# Patient Record
Sex: MALE | Race: WHITE | Employment: OTHER | URBAN - METROPOLITAN AREA
[De-identification: names, ages, dates, MRNs, and addresses within clinical notes are randomized per-mention and may not be internally consistent; named-entity substitution may affect disease eponyms.]

---

## 2017-01-04 PROBLEM — I10 ESSENTIAL HYPERTENSION: Status: ACTIVE | Noted: 2017-01-04

## 2017-06-01 PROBLEM — M54.5 CHRONIC MIDLINE LOW BACK PAIN, WITH SCIATICA PRESENCE UNSPECIFIED: Status: ACTIVE | Noted: 2017-06-01

## 2017-06-01 PROBLEM — G89.29 CHRONIC MIDLINE LOW BACK PAIN, WITH SCIATICA PRESENCE UNSPECIFIED: Status: ACTIVE | Noted: 2017-06-01

## 2017-09-14 PROBLEM — M16.12 ARTHRITIS OF LEFT HIP: Status: ACTIVE | Noted: 2017-09-14

## 2020-07-06 PROBLEM — G56.21 CUBITAL TUNNEL SYNDROME ON RIGHT: Status: ACTIVE | Noted: 2020-07-06

## 2020-07-06 PROBLEM — M65.341 TRIGGER RING FINGER OF RIGHT HAND: Status: ACTIVE | Noted: 2020-07-06

## 2020-07-06 PROBLEM — M65.342 TRIGGER RING FINGER OF LEFT HAND: Status: ACTIVE | Noted: 2020-07-06

## 2020-08-04 PROBLEM — G56.01 RIGHT CARPAL TUNNEL SYNDROME: Status: ACTIVE | Noted: 2020-08-04

## 2020-09-03 PROBLEM — M65.342 TRIGGER RING FINGER OF LEFT HAND: Status: RESOLVED | Noted: 2020-07-06 | Resolved: 2020-09-03

## 2020-09-03 PROBLEM — M65.341 TRIGGER RING FINGER OF RIGHT HAND: Status: RESOLVED | Noted: 2020-07-06 | Resolved: 2020-09-03

## 2020-09-03 PROBLEM — M54.5 CHRONIC MIDLINE LOW BACK PAIN, WITH SCIATICA PRESENCE UNSPECIFIED: Status: RESOLVED | Noted: 2017-06-01 | Resolved: 2020-09-03

## 2020-09-03 PROBLEM — G89.29 CHRONIC MIDLINE LOW BACK PAIN, WITH SCIATICA PRESENCE UNSPECIFIED: Status: RESOLVED | Noted: 2017-06-01 | Resolved: 2020-09-03

## 2021-02-16 PROBLEM — I10 ESSENTIAL HYPERTENSION: Status: RESOLVED | Noted: 2017-01-04 | Resolved: 2021-02-16

## 2021-03-24 RX ORDER — IBUPROFEN 400 MG/1
400 TABLET ORAL EVERY 6 HOURS PRN
Status: ON HOLD | COMMUNITY
End: 2021-05-04

## 2021-04-28 ENCOUNTER — ANESTHESIA EVENT (OUTPATIENT)
Dept: SURGERY | Facility: HOSPITAL | Age: 81
End: 2021-04-28
Payer: MEDICARE

## 2021-04-28 NOTE — H&P (VIEW-ONLY)
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     Physical Exam  Vital Signs:  Ht 5' 6\" (1.676 m)   Wt 187 lb (84.8 kg)   BMI 30.18 kg/m²       antalgic gait  without assi consistent with previous perthes disease. There is no fracture or dislocation. No periosteal reactions or medullary lesions are seen.       MRI hip reviewed   IMPRESSION:   1.  Age-indeterminate shallow segmental insufficiency fracture of the left femoral thorough discussion, the patient has elected to undergo surgical treatment. The details of the surgical procedure were explained including the location of probable incisions and a description of the likely implants to be used.   Models and diagrams were us during surgery, or within several weeks after surgery. Patients with osteopenia or osteoporosis are at increased risk for fracture during and after surgery. You may have problems with your heart and/or kidneys.  Patients with history of heart disease are a including morning of.

## 2021-04-30 ENCOUNTER — LAB ENCOUNTER (OUTPATIENT)
Dept: LAB | Age: 81
End: 2021-04-30
Attending: ORTHOPAEDIC SURGERY
Payer: MEDICARE

## 2021-04-30 DIAGNOSIS — M16.12 ARTHRITIS OF LEFT HIP: ICD-10-CM

## 2021-04-30 PROCEDURE — 86900 BLOOD TYPING SEROLOGIC ABO: CPT

## 2021-04-30 PROCEDURE — 86850 RBC ANTIBODY SCREEN: CPT

## 2021-04-30 PROCEDURE — 86901 BLOOD TYPING SEROLOGIC RH(D): CPT

## 2021-05-03 ENCOUNTER — APPOINTMENT (OUTPATIENT)
Dept: GENERAL RADIOLOGY | Facility: HOSPITAL | Age: 81
End: 2021-05-03
Attending: ORTHOPAEDIC SURGERY
Payer: MEDICARE

## 2021-05-03 ENCOUNTER — ANESTHESIA (OUTPATIENT)
Dept: SURGERY | Facility: HOSPITAL | Age: 81
End: 2021-05-03
Payer: MEDICARE

## 2021-05-03 ENCOUNTER — HOSPITAL ENCOUNTER (OUTPATIENT)
Facility: HOSPITAL | Age: 81
Discharge: HOME HEALTH | End: 2021-05-04
Attending: ORTHOPAEDIC SURGERY | Admitting: ORTHOPAEDIC SURGERY
Payer: MEDICARE

## 2021-05-03 DIAGNOSIS — M16.12 ARTHRITIS OF LEFT HIP: Primary | ICD-10-CM

## 2021-05-03 DIAGNOSIS — Z87.39 HISTORY OF LEGG-CALVE-PERTHES DISEASE: ICD-10-CM

## 2021-05-03 DIAGNOSIS — M16.12 PRIMARY OSTEOARTHRITIS OF LEFT HIP: ICD-10-CM

## 2021-05-03 PROCEDURE — 88304 TISSUE EXAM BY PATHOLOGIST: CPT | Performed by: ORTHOPAEDIC SURGERY

## 2021-05-03 PROCEDURE — 76000 FLUOROSCOPY <1 HR PHYS/QHP: CPT | Performed by: ORTHOPAEDIC SURGERY

## 2021-05-03 PROCEDURE — 0SRB049 REPLACEMENT OF LEFT HIP JOINT WITH CERAMIC ON POLYETHYLENE SYNTHETIC SUBSTITUTE, CEMENTED, OPEN APPROACH: ICD-10-PCS | Performed by: ORTHOPAEDIC SURGERY

## 2021-05-03 PROCEDURE — 88311 DECALCIFY TISSUE: CPT | Performed by: ORTHOPAEDIC SURGERY

## 2021-05-03 DEVICE — REFOBACIN BC R 1X40 US: Type: IMPLANTABLE DEVICE | Site: HIP | Status: FUNCTIONAL

## 2021-05-03 DEVICE — BIOLOX® DELTA, CERAMIC FEMORAL HEAD, S, Ø 36/-3.5, TAPER 12/14
Type: IMPLANTABLE DEVICE | Site: HIP | Status: FUNCTIONAL
Brand: BIOLOX® DELTA

## 2021-05-03 DEVICE — G7 PPS LTD ACET SHL 54F: Type: IMPLANTABLE DEVICE | Site: HIP | Status: FUNCTIONAL

## 2021-05-03 RX ORDER — ACETAMINOPHEN 325 MG/1
650 TABLET ORAL ONCE
Status: COMPLETED | OUTPATIENT
Start: 2021-05-03 | End: 2021-05-03

## 2021-05-03 RX ORDER — METOPROLOL SUCCINATE 100 MG/1
100 TABLET, EXTENDED RELEASE ORAL DAILY
Status: DISCONTINUED | OUTPATIENT
Start: 2021-05-04 | End: 2021-05-04

## 2021-05-03 RX ORDER — ONDANSETRON 2 MG/ML
4 INJECTION INTRAMUSCULAR; INTRAVENOUS EVERY 4 HOURS PRN
Status: DISCONTINUED | OUTPATIENT
Start: 2021-05-03 | End: 2021-05-04

## 2021-05-03 RX ORDER — ACETAMINOPHEN 500 MG
1000 TABLET ORAL ONCE
Status: DISCONTINUED | OUTPATIENT
Start: 2021-05-03 | End: 2021-05-03

## 2021-05-03 RX ORDER — DEXAMETHASONE SODIUM PHOSPHATE 10 MG/ML
8 INJECTION, SOLUTION INTRAMUSCULAR; INTRAVENOUS ONCE
Status: COMPLETED | OUTPATIENT
Start: 2021-05-04 | End: 2021-05-04

## 2021-05-03 RX ORDER — CEFAZOLIN SODIUM/WATER 2 G/20 ML
2 SYRINGE (ML) INTRAVENOUS EVERY 8 HOURS
Status: COMPLETED | OUTPATIENT
Start: 2021-05-03 | End: 2021-05-04

## 2021-05-03 RX ORDER — LISINOPRIL 20 MG/1
20 TABLET ORAL DAILY
COMMUNITY
End: 2021-07-16

## 2021-05-03 RX ORDER — MIDAZOLAM HYDROCHLORIDE 1 MG/ML
1 INJECTION INTRAMUSCULAR; INTRAVENOUS EVERY 5 MIN PRN
Status: DISCONTINUED | OUTPATIENT
Start: 2021-05-03 | End: 2021-05-03 | Stop reason: HOSPADM

## 2021-05-03 RX ORDER — SODIUM PHOSPHATE, DIBASIC AND SODIUM PHOSPHATE, MONOBASIC 7; 19 G/133ML; G/133ML
1 ENEMA RECTAL ONCE AS NEEDED
Status: DISCONTINUED | OUTPATIENT
Start: 2021-05-03 | End: 2021-05-04

## 2021-05-03 RX ORDER — MEPERIDINE HYDROCHLORIDE 25 MG/ML
12.5 INJECTION INTRAMUSCULAR; INTRAVENOUS; SUBCUTANEOUS AS NEEDED
Status: DISCONTINUED | OUTPATIENT
Start: 2021-05-03 | End: 2021-05-03 | Stop reason: HOSPADM

## 2021-05-03 RX ORDER — SENNOSIDES 8.6 MG
17.2 TABLET ORAL NIGHTLY
Status: DISCONTINUED | OUTPATIENT
Start: 2021-05-03 | End: 2021-05-04

## 2021-05-03 RX ORDER — HYDROMORPHONE HYDROCHLORIDE 1 MG/ML
0.4 INJECTION, SOLUTION INTRAMUSCULAR; INTRAVENOUS; SUBCUTANEOUS EVERY 2 HOUR PRN
Status: DISCONTINUED | OUTPATIENT
Start: 2021-05-03 | End: 2021-05-04

## 2021-05-03 RX ORDER — ONDANSETRON 2 MG/ML
4 INJECTION INTRAMUSCULAR; INTRAVENOUS AS NEEDED
Status: DISCONTINUED | OUTPATIENT
Start: 2021-05-03 | End: 2021-05-03 | Stop reason: HOSPADM

## 2021-05-03 RX ORDER — TRANEXAMIC ACID 10 MG/ML
1000 INJECTION, SOLUTION INTRAVENOUS ONCE
Status: COMPLETED | OUTPATIENT
Start: 2021-05-03 | End: 2021-05-03

## 2021-05-03 RX ORDER — DEXAMETHASONE SODIUM PHOSPHATE 4 MG/ML
VIAL (ML) INJECTION AS NEEDED
Status: DISCONTINUED | OUTPATIENT
Start: 2021-05-03 | End: 2021-05-03 | Stop reason: SURG

## 2021-05-03 RX ORDER — HYDROMORPHONE HYDROCHLORIDE 1 MG/ML
0.2 INJECTION, SOLUTION INTRAMUSCULAR; INTRAVENOUS; SUBCUTANEOUS EVERY 2 HOUR PRN
Status: DISCONTINUED | OUTPATIENT
Start: 2021-05-03 | End: 2021-05-04

## 2021-05-03 RX ORDER — DIPHENHYDRAMINE HYDROCHLORIDE 50 MG/ML
25 INJECTION INTRAMUSCULAR; INTRAVENOUS ONCE AS NEEDED
Status: ACTIVE | OUTPATIENT
Start: 2021-05-03 | End: 2021-05-03

## 2021-05-03 RX ORDER — SODIUM CHLORIDE 9 MG/ML
INJECTION, SOLUTION INTRAVENOUS CONTINUOUS
Status: DISCONTINUED | OUTPATIENT
Start: 2021-05-03 | End: 2021-05-04

## 2021-05-03 RX ORDER — OXYCODONE HYDROCHLORIDE 5 MG/1
2.5 TABLET ORAL EVERY 4 HOURS PRN
Status: DISCONTINUED | OUTPATIENT
Start: 2021-05-03 | End: 2021-05-04

## 2021-05-03 RX ORDER — LISINOPRIL 20 MG/1
20 TABLET ORAL NIGHTLY
Status: DISCONTINUED | OUTPATIENT
Start: 2021-05-03 | End: 2021-05-04

## 2021-05-03 RX ORDER — ASPIRIN 81 MG/1
81 TABLET ORAL 2 TIMES DAILY
Status: DISCONTINUED | OUTPATIENT
Start: 2021-05-03 | End: 2021-05-04

## 2021-05-03 RX ORDER — TEMAZEPAM 15 MG/1
15 CAPSULE ORAL NIGHTLY PRN
Status: DISCONTINUED | OUTPATIENT
Start: 2021-05-03 | End: 2021-05-04

## 2021-05-03 RX ORDER — CEFAZOLIN SODIUM/WATER 2 G/20 ML
SYRINGE (ML) INTRAVENOUS
Status: DISPENSED
Start: 2021-05-03 | End: 2021-05-04

## 2021-05-03 RX ORDER — BUPIVACAINE HYDROCHLORIDE 7.5 MG/ML
INJECTION, SOLUTION INTRASPINAL AS NEEDED
Status: DISCONTINUED | OUTPATIENT
Start: 2021-05-03 | End: 2021-05-03 | Stop reason: SURG

## 2021-05-03 RX ORDER — DIPHENHYDRAMINE HYDROCHLORIDE 50 MG/ML
12.5 INJECTION INTRAMUSCULAR; INTRAVENOUS EVERY 4 HOURS PRN
Status: DISCONTINUED | OUTPATIENT
Start: 2021-05-03 | End: 2021-05-04

## 2021-05-03 RX ORDER — NALOXONE HYDROCHLORIDE 0.4 MG/ML
80 INJECTION, SOLUTION INTRAMUSCULAR; INTRAVENOUS; SUBCUTANEOUS AS NEEDED
Status: DISCONTINUED | OUTPATIENT
Start: 2021-05-03 | End: 2021-05-03 | Stop reason: HOSPADM

## 2021-05-03 RX ORDER — KETOROLAC TROMETHAMINE 15 MG/ML
15 INJECTION, SOLUTION INTRAMUSCULAR; INTRAVENOUS EVERY 6 HOURS
Status: DISCONTINUED | OUTPATIENT
Start: 2021-05-03 | End: 2021-05-04

## 2021-05-03 RX ORDER — ACETAMINOPHEN 325 MG/1
TABLET ORAL
Status: COMPLETED
Start: 2021-05-03 | End: 2021-05-03

## 2021-05-03 RX ORDER — SODIUM CHLORIDE, SODIUM LACTATE, POTASSIUM CHLORIDE, CALCIUM CHLORIDE 600; 310; 30; 20 MG/100ML; MG/100ML; MG/100ML; MG/100ML
INJECTION, SOLUTION INTRAVENOUS CONTINUOUS
Status: DISCONTINUED | OUTPATIENT
Start: 2021-05-03 | End: 2021-05-04

## 2021-05-03 RX ORDER — DOCUSATE SODIUM 100 MG/1
100 CAPSULE, LIQUID FILLED ORAL 2 TIMES DAILY
Status: DISCONTINUED | OUTPATIENT
Start: 2021-05-03 | End: 2021-05-04

## 2021-05-03 RX ORDER — SIMVASTATIN 80 MG
80 TABLET ORAL NIGHTLY
COMMUNITY
End: 2021-07-16

## 2021-05-03 RX ORDER — POLYETHYLENE GLYCOL 3350 17 G/17G
17 POWDER, FOR SOLUTION ORAL DAILY PRN
Status: DISCONTINUED | OUTPATIENT
Start: 2021-05-03 | End: 2021-05-04

## 2021-05-03 RX ORDER — HYDROMORPHONE HYDROCHLORIDE 1 MG/ML
0.4 INJECTION, SOLUTION INTRAMUSCULAR; INTRAVENOUS; SUBCUTANEOUS EVERY 5 MIN PRN
Status: DISCONTINUED | OUTPATIENT
Start: 2021-05-03 | End: 2021-05-03 | Stop reason: HOSPADM

## 2021-05-03 RX ORDER — OXYCODONE HYDROCHLORIDE 5 MG/1
5 TABLET ORAL EVERY 4 HOURS PRN
Status: DISCONTINUED | OUTPATIENT
Start: 2021-05-03 | End: 2021-05-04

## 2021-05-03 RX ORDER — DIPHENHYDRAMINE HCL 25 MG
25 CAPSULE ORAL EVERY 4 HOURS PRN
Status: DISCONTINUED | OUTPATIENT
Start: 2021-05-03 | End: 2021-05-04

## 2021-05-03 RX ORDER — ONDANSETRON 2 MG/ML
INJECTION INTRAMUSCULAR; INTRAVENOUS AS NEEDED
Status: DISCONTINUED | OUTPATIENT
Start: 2021-05-03 | End: 2021-05-03 | Stop reason: SURG

## 2021-05-03 RX ORDER — ATORVASTATIN CALCIUM 40 MG/1
40 TABLET, FILM COATED ORAL NIGHTLY
Status: DISCONTINUED | OUTPATIENT
Start: 2021-05-03 | End: 2021-05-04

## 2021-05-03 RX ORDER — CEFAZOLIN SODIUM/WATER 2 G/20 ML
2 SYRINGE (ML) INTRAVENOUS ONCE
Status: COMPLETED | OUTPATIENT
Start: 2021-05-03 | End: 2021-05-03

## 2021-05-03 RX ORDER — SODIUM CHLORIDE, SODIUM LACTATE, POTASSIUM CHLORIDE, CALCIUM CHLORIDE 600; 310; 30; 20 MG/100ML; MG/100ML; MG/100ML; MG/100ML
INJECTION, SOLUTION INTRAVENOUS CONTINUOUS
Status: DISCONTINUED | OUTPATIENT
Start: 2021-05-03 | End: 2021-05-03 | Stop reason: HOSPADM

## 2021-05-03 RX ORDER — BISACODYL 10 MG
10 SUPPOSITORY, RECTAL RECTAL
Status: DISCONTINUED | OUTPATIENT
Start: 2021-05-03 | End: 2021-05-04

## 2021-05-03 RX ORDER — MIDAZOLAM HYDROCHLORIDE 1 MG/ML
INJECTION INTRAMUSCULAR; INTRAVENOUS AS NEEDED
Status: DISCONTINUED | OUTPATIENT
Start: 2021-05-03 | End: 2021-05-03 | Stop reason: SURG

## 2021-05-03 RX ORDER — KETAMINE HYDROCHLORIDE 50 MG/ML
INJECTION, SOLUTION, CONCENTRATE INTRAMUSCULAR; INTRAVENOUS AS NEEDED
Status: DISCONTINUED | OUTPATIENT
Start: 2021-05-03 | End: 2021-05-03 | Stop reason: SURG

## 2021-05-03 RX ORDER — ACETAMINOPHEN 325 MG/1
650 TABLET ORAL 4 TIMES DAILY
Status: DISCONTINUED | OUTPATIENT
Start: 2021-05-03 | End: 2021-05-04

## 2021-05-03 RX ORDER — PROCHLORPERAZINE EDISYLATE 5 MG/ML
10 INJECTION INTRAMUSCULAR; INTRAVENOUS EVERY 6 HOURS PRN
Status: DISCONTINUED | OUTPATIENT
Start: 2021-05-03 | End: 2021-05-04

## 2021-05-03 RX ORDER — METOPROLOL SUCCINATE 100 MG/1
100 TABLET, EXTENDED RELEASE ORAL DAILY
COMMUNITY
End: 2021-07-15

## 2021-05-03 RX ADMIN — ONDANSETRON 4 MG: 2 INJECTION INTRAMUSCULAR; INTRAVENOUS at 15:34:00

## 2021-05-03 RX ADMIN — KETAMINE HYDROCHLORIDE 25 MG: 50 INJECTION, SOLUTION, CONCENTRATE INTRAMUSCULAR; INTRAVENOUS at 15:10:00

## 2021-05-03 RX ADMIN — CEFAZOLIN SODIUM/WATER 2 G: 2 G/20 ML SYRINGE (ML) INTRAVENOUS at 15:00:00

## 2021-05-03 RX ADMIN — TRANEXAMIC ACID 1000 MG: 10 INJECTION, SOLUTION INTRAVENOUS at 15:01:00

## 2021-05-03 RX ADMIN — DEXAMETHASONE SODIUM PHOSPHATE 4 MG: 4 MG/ML VIAL (ML) INJECTION at 15:34:00

## 2021-05-03 RX ADMIN — SODIUM CHLORIDE, SODIUM LACTATE, POTASSIUM CHLORIDE, CALCIUM CHLORIDE: 600; 310; 30; 20 INJECTION, SOLUTION INTRAVENOUS at 16:31:00

## 2021-05-03 RX ADMIN — MIDAZOLAM HYDROCHLORIDE 2 MG: 1 INJECTION INTRAMUSCULAR; INTRAVENOUS at 14:58:00

## 2021-05-03 RX ADMIN — BUPIVACAINE HYDROCHLORIDE 1.3 ML: 7.5 INJECTION, SOLUTION INTRASPINAL at 14:55:00

## 2021-05-03 NOTE — ANESTHESIA POSTPROCEDURE EVALUATION
[address] Patient Status:  Outpatient in a Bed   Age/Gender [de-identified]year old male MRN [id number]   [facility] SURGERY Attending Dary Leung MD   Hosp Day # 0 PCP Elpidio Meza MD       Anesthesia Post-op Note    LEFT TOTA

## 2021-05-03 NOTE — INTERVAL H&P NOTE
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

## 2021-05-03 NOTE — PROGRESS NOTES
PT NOTES SOME IMPROVEMENT AND CAN FEELS TOUCH SENSATION, NUMBNESS BEHIND THE RIGHT KNEE AND RIGHT LATERAL MID THIGH

## 2021-05-03 NOTE — PROGRESS NOTES
PT EXPRESSES CONCERN THAT HE FEELS ABSENCE OF SENSATION ON RIGHT LEG BELOW THE AREA JUST ABOVE MID RIGHT THIGH.   FEELS NUMBNESS AND TOUCH SENSATION AT APPROXIMATELY L3 LEVEL, MOVE RIGHT LEG, FEELS HAS BETTER CONTROL ON LEG MOVEMENT, FLEXION AND EXTENSION,

## 2021-05-03 NOTE — ANESTHESIA PROCEDURE NOTES
Spinal Block  Performed by: Seth Santiago MD  Authorized by: Seth Santiago MD       General Information and Staff    Start Time:  5/3/2021 2:50 PM  End Time:  5/3/2021 3:00 PM  Anesthesiologist:  Seth Santiago MD  Performed by:   Anesthesiologist  Vargas

## 2021-05-03 NOTE — OPERATIVE REPORT
OPERATIVE REPORT    Facility:  [address] [place] [address]    Patient Name:  Nathaly Morse    Age/Gender:  [de-identified]year old male  :  1940    MRN:  [id number]    Date of Operation:  5/3/2021    Preoperative Diagnosis:   LEFT HIP OSTEOARTHRITIS    Postoperative Osteotomy of the femoral neck was performed using a sagittal saw. A corkscrew was used to remove the femoral head. Retractors were placed anterior and posterior to the acetabulum.   The hip labrum as well as the soft tissue in the cotyloid fossa were rosie was then reduced under direct visualization. Fluoroscopy again verified length, offset, implant position and stem fill of the femoral canal.  Fluoro also verified there were no iatrogenic fractures.   The wound was copiously irrigated and the tag sutures i

## 2021-05-04 ENCOUNTER — APPOINTMENT (OUTPATIENT)
Dept: GENERAL RADIOLOGY | Facility: HOSPITAL | Age: 81
End: 2021-05-04
Attending: ORTHOPAEDIC SURGERY
Payer: MEDICARE

## 2021-05-04 VITALS
OXYGEN SATURATION: 93 % | SYSTOLIC BLOOD PRESSURE: 133 MMHG | TEMPERATURE: 98 F | RESPIRATION RATE: 18 BRPM | HEART RATE: 69 BPM | BODY MASS INDEX: 30.65 KG/M2 | WEIGHT: 195.31 LBS | HEIGHT: 67 IN | DIASTOLIC BLOOD PRESSURE: 58 MMHG

## 2021-05-04 PROCEDURE — 97161 PT EVAL LOW COMPLEX 20 MIN: CPT

## 2021-05-04 PROCEDURE — 97165 OT EVAL LOW COMPLEX 30 MIN: CPT

## 2021-05-04 PROCEDURE — 97535 SELF CARE MNGMENT TRAINING: CPT

## 2021-05-04 PROCEDURE — 97116 GAIT TRAINING THERAPY: CPT

## 2021-05-04 PROCEDURE — 73501 X-RAY EXAM HIP UNI 1 VIEW: CPT | Performed by: ORTHOPAEDIC SURGERY

## 2021-05-04 NOTE — HOME CARE LIAISON
Patient setup with [address] pre-operatively. [facility] to follow upon discharge. Met with patient and spouse Skyler Jara at the bedside to discuss [facility] home health services and confirm choice.  Patient and spouse remain agreeab

## 2021-05-04 NOTE — OCCUPATIONAL THERAPY NOTE
OCCUPATIONAL THERAPY QUICK EVALUATION - INPATIENT    Room Number: 374/374-A  Evaluation Date: 5/4/2021     Type of Evaluation: Quick Eval  Presenting Problem: S/P L DAVID 5/3    Physician Order: IP Consult to Occupational Therapy  Reason for Therapy:  ADL/IA toileting aid at home that he uses. SUBJECTIVE   \"We've had friends recommend the sock aid, so we came prepared and have the hip kit\"    Patient self-stated goal is not identified. OBJECTIVE  Precautions: Other (Comment); Bed/chair alarm (No hip pr underpants and pants above knees, and completed a sit > stand T/F via RW to don pants up to hips with supervision. Pt educated on dressing surgical leg first, and using proper body/hand positioning during functional T/F.  Pt educated on using dressing stick candidate based on 38.32 percentage of impairment. Pt has support at home and has practice using assistive devices at home prior to surgery. Based on OT evaluation, pt is safe to DC home. No further skilled OT services is required at this time.       Pa

## 2021-05-04 NOTE — PROGRESS NOTES
Progress Note    Patient: Kristen Singh  Medical record #: [id number]    Kristen Singh is a [de-identified]year old  male who is POD #1 left DAVID. The patient's main complaint today is surgical pain at the operative site. Denies paresthesias/CP/SOA.  Patient has bee PRN  ondansetron HCl (ZOFRAN) injection 4 mg, 4 mg, Intravenous, Q4H PRN  Prochlorperazine Edisylate (COMPAZINE) injection 10 mg, 10 mg, Intravenous, Q6H PRN  [] diphenhydrAMINE HCl (BENADRYL) IV PUSH injection 25 mg, 25 mg, Intravenous, Once PRN  d to bleeding concerns  Disposition: plan discharge today if doing well with PT, consider home vs rehab if needed    Follow up with Dr. Isabella Vera in 2 weeks    Followed by Physician group for medical conditions to include Active Problems:    * No active hospit

## 2021-05-04 NOTE — PLAN OF CARE
Denies pain. States ready to go home. States has all medication that was prescribed already filled & at home. He and wife viewed discharge instruction video. Verbal and written discharge instructions reviewed. Verbalized understanding.   Awaiting trans

## 2021-05-04 NOTE — CONSULTS
[facility] Hospitalist Initial Consult       Reason for consult: Medical Management sp DAVID      History of Present Illness: Patient is a [de-identified]year old male with PMH sig for OA, cad sp cardiomyopathy  who presents sp DAVID.    They tolerated the procedure well without (88.6 kg)   SpO2 96%   BMI 31.53 kg/m²   General:  Alert, no distress, appears stated age. Head:  Normocephalic, without obvious abnormality, atraumatic. Eyes:  Sclera anicteric, No conjunctival pallor, EOMs intact.     Nose: Nares normal. [facility] Hospitalist  Pager [phone]

## 2021-05-04 NOTE — PROGRESS NOTES
[address] Note                                                                   [address]  11/16/1940    SUBJECTIVE:  No acute events. Pain controlled.       OBJECTIVE:

## 2021-05-04 NOTE — CM/SW NOTE
[de-identified] yo sp total hip arthroplasty. Post op protocol orders for discharge planning. Pt was referred to [facility] preoperatively from surgeon's office. PT is recommending home health.      HOME SITUATION  Type of Home: [address]

## 2021-05-04 NOTE — PHYSICAL THERAPY NOTE
PHYSICAL THERAPY QUICK EVALUATION - INPATIENT    Room Number: 374/374-A  Evaluation Date: 5/4/2021  Presenting Problem: s/p left DAVID 5/3/21  Physician Order: PT Eval and Treat    Problem List  Active Problems:    * No active hospital problems.  *      Pas None                   ACTIVITY TOLERANCE                         O2 WALK       AM-PAC '6-Clicks' INPATIENT SHORT FORM - BASIC MOBILITY  How much difficulty does the patient currently have. ..  -   Turning over in bed (including adjusting bedclothes, sheets chair; With [address] [facility] staff;Needs met;Call light within reach;RN aware of session/findings; All patient questions and concerns addressed (working with OT in pt room)    ASSESSMENT   Patient is a [de-identified]year old male admitted on 5/3/2021 for elective left DAVID.   Taty Parker

## 2021-05-04 NOTE — CM/SW NOTE
05/04/21 1000   Discharge disposition   Expected discharge disposition Home-Health   Name of Facillity/Home Care/Hospice [facility]   Discharge transportation Private car

## 2021-05-04 NOTE — PLAN OF CARE
PT AOX4 this PM. VSS on RA. Decreased sensation to LLE. Sensation to RLE improving, pt explains he can feel lighter touch to R thigh and R calf. , IS. SCDS bilaterally. WBAT. Pt resting in bed tonight. Denies pain, pt is comfortable for now.  IVFS infusi

## 2021-05-19 PROBLEM — Z96.642 STATUS POST TOTAL HIP REPLACEMENT, LEFT: Status: ACTIVE | Noted: 2021-05-19

## 2021-11-29 NOTE — ANESTHESIA PREPROCEDURE EVALUATION
PRE-OP EVALUATION    Patient Name: Julio Rivera    Admit Diagnosis: Primary osteoarthritis of left hip [M16.12]  History of Ujog-Tsqlb-Ydvlvqy disease [Z87.39]    Pre-op Diagnosis: Primary osteoarthritis of left hip [M16.12]  History of Mcen-Jsfnv-Tmhuyp Ok to take over the counter and ok to continue past 2 weeks.  Do not exceed 4,000 mg per day., Disp: 168 tablet, Rfl: 0, 5/3/2021 at 0928  ibuprofen 400 MG Oral Tab, Take 400 mg by mouth every 6 (six) hours as needed for Pain., Disp: , Rfl: , 4/12/2021  asp larger, the ejection fraction is lower,    and there is a small component of ischemia.     Prepared and signed by  Solange Cortés MD, [facility]  02/12/2020 15:49      Cleared by cardiology   Negative cardiovascular ROS. ECG reviewed.   Exercise tolerance: good meets guidelines. Patient has taken beta blockers in last 24 hours. Comment: Discussed Spinal and GA as a backup. All questions answered.   Plan/risks discussed with: patient                Present on Admission:  **None** 149.9

## 2022-01-31 PROBLEM — M65.321 TRIGGER INDEX FINGER OF RIGHT HAND: Status: ACTIVE | Noted: 2020-07-06

## 2022-01-31 PROBLEM — M65.342 TRIGGER RING FINGER OF LEFT HAND: Status: ACTIVE | Noted: 2022-01-31

## 2022-03-30 DIAGNOSIS — Z01.812 PRE-PROCEDURE LAB EXAM: Primary | ICD-10-CM

## 2022-03-31 RX ORDER — SIMVASTATIN 80 MG
80 TABLET ORAL NIGHTLY
COMMUNITY
End: 2022-05-11 | Stop reason: ALTCHOICE

## 2022-03-31 RX ORDER — LANOLIN ALCOHOL/MO/W.PET/CERES
1000 CREAM (GRAM) TOPICAL DAILY
COMMUNITY

## 2022-03-31 RX ORDER — FLUOROURACIL 50 MG/G
CREAM TOPICAL
COMMUNITY

## 2022-03-31 RX ORDER — LISINOPRIL 20 MG/1
20 TABLET ORAL AT BEDTIME
Status: ON HOLD | COMMUNITY
End: 2022-05-18 | Stop reason: HOSPADM

## 2022-03-31 RX ORDER — METOPROLOL SUCCINATE 100 MG/1
100 TABLET, EXTENDED RELEASE ORAL DAILY
Status: ON HOLD | COMMUNITY
End: 2022-05-18 | Stop reason: HOSPADM

## 2022-04-01 ENCOUNTER — HOSPITAL ENCOUNTER (OUTPATIENT)
Dept: LAB | Age: 82
Discharge: HOME | End: 2022-04-01
Attending: INTERNAL MEDICINE

## 2022-04-01 DIAGNOSIS — Z01.812 PRE-PROCEDURE LAB EXAM: ICD-10-CM

## 2022-04-01 LAB
FLUAV RNA RESP QL NAA+PROBE: NOT DETECTED
FLUBV RNA RESP QL NAA+PROBE: NOT DETECTED
SARS-COV-2 RNA RESP QL NAA+PROBE: NOT DETECTED
SERVICE CMNT-IMP: NORMAL
SERVICE CMNT-IMP: NORMAL

## 2022-04-01 PROCEDURE — U0003 INFECTIOUS AGENT DETECTION BY NUCLEIC ACID (DNA OR RNA); SEVERE ACUTE RESPIRATORY SYNDROME CORONAVIRUS 2 (SARS-COV-2) (CORONAVIRUS DISEASE [COVID-19]), AMPLIFIED PROBE TECHNIQUE, MAKING USE OF HIGH THROUGHPUT TECHNOLOGIES AS DESCRIBED BY CMS-2020-01-R: HCPCS | Performed by: INTERNAL MEDICINE

## 2022-04-04 ENCOUNTER — HOSPITAL ENCOUNTER (OUTPATIENT)
Age: 82
Discharge: HOME | End: 2022-04-04
Attending: INTERNAL MEDICINE | Admitting: INTERNAL MEDICINE

## 2022-04-04 ENCOUNTER — APPOINTMENT (OUTPATIENT)
Dept: ULTRASOUND IMAGING | Age: 82
End: 2022-04-04
Attending: NURSE PRACTITIONER

## 2022-04-04 ENCOUNTER — PREP FOR CASE (OUTPATIENT)
Dept: SURGERY | Age: 82
End: 2022-04-04

## 2022-04-04 ENCOUNTER — APPOINTMENT (OUTPATIENT)
Dept: CARDIOLOGY | Age: 82
End: 2022-04-04
Attending: NURSE PRACTITIONER

## 2022-04-04 VITALS
RESPIRATION RATE: 15 BRPM | SYSTOLIC BLOOD PRESSURE: 128 MMHG | HEIGHT: 67 IN | WEIGHT: 193.34 LBS | DIASTOLIC BLOOD PRESSURE: 63 MMHG | OXYGEN SATURATION: 98 % | TEMPERATURE: 98.2 F | BODY MASS INDEX: 30.35 KG/M2 | HEART RATE: 78 BPM

## 2022-04-04 DIAGNOSIS — I25.10 CORONARY ARTERY DISEASE INVOLVING NATIVE CORONARY ARTERY OF NATIVE HEART WITHOUT ANGINA PECTORIS: Primary | ICD-10-CM

## 2022-04-04 DIAGNOSIS — R93.1 ABNORMAL NUCLEAR CARDIAC IMAGING TEST: ICD-10-CM

## 2022-04-04 LAB
AORTIC VALVE AREA: NORMAL
AV MEAN GRADIENT (AVMG): NORMAL
AV MEAN VELOCITY (AVMV): NORMAL
AV PEAK GRADIENT (AVPG): NORMAL
AV PEAK VELOCITY (AVPV): NORMAL
AV STENOSIS SEVERITY TEXT: NORMAL
DEPRECATED RDW RBC: 47.9 FL (ref 39–50)
ERYTHROCYTE [DISTWIDTH] IN BLOOD: 13.2 % (ref 11–15)
HCT VFR BLD CALC: 41.8 % (ref 39–51)
HGB BLD-MCNC: 13.8 G/DL (ref 13–17)
LV EF: NORMAL %
MCH RBC QN AUTO: 32.8 PG (ref 26–34)
MCHC RBC AUTO-ENTMCNC: 33 G/DL (ref 32–36.5)
MCV RBC AUTO: 99.3 FL (ref 78–100)
NRBC BLD MANUAL-RTO: 0 /100 WBC
PLATELET # BLD AUTO: 187 K/MCL (ref 140–450)
RBC # BLD: 4.21 MIL/MCL (ref 4.5–5.9)
WBC # BLD: 6.2 K/MCL (ref 4.2–11)

## 2022-04-04 PROCEDURE — C1769 GUIDE WIRE: HCPCS | Performed by: INTERNAL MEDICINE

## 2022-04-04 PROCEDURE — 93880 EXTRACRANIAL BILAT STUDY: CPT

## 2022-04-04 PROCEDURE — 99153 MOD SED SAME PHYS/QHP EA: CPT | Performed by: INTERNAL MEDICINE

## 2022-04-04 PROCEDURE — 13000001 HB PHASE II RECOVERY EA 30 MINUTES: Performed by: INTERNAL MEDICINE

## 2022-04-04 PROCEDURE — C1894 INTRO/SHEATH, NON-LASER: HCPCS | Performed by: INTERNAL MEDICINE

## 2022-04-04 PROCEDURE — 93458 L HRT ARTERY/VENTRICLE ANGIO: CPT | Performed by: INTERNAL MEDICINE

## 2022-04-04 PROCEDURE — C1887 CATHETER, GUIDING: HCPCS | Performed by: INTERNAL MEDICINE

## 2022-04-04 PROCEDURE — 10002800 HB RX 250 W HCPCS: Performed by: INTERNAL MEDICINE

## 2022-04-04 PROCEDURE — 99152 MOD SED SAME PHYS/QHP 5/>YRS: CPT | Performed by: INTERNAL MEDICINE

## 2022-04-04 PROCEDURE — 10006023 HB SUPPLY 272: Performed by: INTERNAL MEDICINE

## 2022-04-04 PROCEDURE — 85027 COMPLETE CBC AUTOMATED: CPT | Performed by: INTERNAL MEDICINE

## 2022-04-04 PROCEDURE — 10002807 HB RX 258: Performed by: INTERNAL MEDICINE

## 2022-04-04 PROCEDURE — 10002805 HB CONTRAST AGENT: Performed by: INTERNAL MEDICINE

## 2022-04-04 PROCEDURE — 93306 TTE W/DOPPLER COMPLETE: CPT

## 2022-04-04 PROCEDURE — 10002801 HB RX 250 W/O HCPCS: Performed by: INTERNAL MEDICINE

## 2022-04-04 RX ORDER — DEXTROSE AND SODIUM CHLORIDE 5; .45 G/100ML; G/100ML
INJECTION, SOLUTION INTRAVENOUS CONTINUOUS
Status: CANCELLED | OUTPATIENT
Start: 2022-04-04

## 2022-04-04 RX ORDER — 0.9 % SODIUM CHLORIDE 0.9 %
2 VIAL (ML) INJECTION EVERY 12 HOURS SCHEDULED
Status: DISCONTINUED | OUTPATIENT
Start: 2022-04-04 | End: 2022-04-04 | Stop reason: HOSPADM

## 2022-04-04 RX ORDER — SODIUM CHLORIDE 9 MG/ML
INJECTION, SOLUTION INTRAVENOUS CONTINUOUS
Status: DISCONTINUED | OUTPATIENT
Start: 2022-04-04 | End: 2022-04-04 | Stop reason: HOSPADM

## 2022-04-04 RX ORDER — LIDOCAINE HYDROCHLORIDE 20 MG/ML
INJECTION, SOLUTION EPIDURAL; INFILTRATION; INTRACAUDAL; PERINEURAL PRN
Status: DISCONTINUED | OUTPATIENT
Start: 2022-04-04 | End: 2022-04-04 | Stop reason: HOSPADM

## 2022-04-04 RX ORDER — SODIUM CHLORIDE 9 MG/ML
INJECTION, SOLUTION INTRAVENOUS CONTINUOUS PRN
Status: CANCELLED | OUTPATIENT
Start: 2022-04-04

## 2022-04-04 RX ORDER — MIDAZOLAM HYDROCHLORIDE 1 MG/ML
INJECTION, SOLUTION INTRAMUSCULAR; INTRAVENOUS PRN
Status: DISCONTINUED | OUTPATIENT
Start: 2022-04-04 | End: 2022-04-04 | Stop reason: HOSPADM

## 2022-04-04 RX ORDER — NICOTINE POLACRILEX 4 MG
30 LOZENGE BUCCAL PRN
Status: CANCELLED | OUTPATIENT
Start: 2022-04-04

## 2022-04-04 RX ORDER — VERAPAMIL HYDROCHLORIDE 2.5 MG/ML
INJECTION, SOLUTION INTRAVENOUS PRN
Status: DISCONTINUED | OUTPATIENT
Start: 2022-04-04 | End: 2022-04-04 | Stop reason: HOSPADM

## 2022-04-04 RX ORDER — DEXTROSE MONOHYDRATE 25 G/50ML
12.5 INJECTION, SOLUTION INTRAVENOUS PRN
Status: CANCELLED | OUTPATIENT
Start: 2022-04-04

## 2022-04-04 RX ORDER — SODIUM CHLORIDE 9 MG/ML
INJECTION, SOLUTION INTRAVENOUS CONTINUOUS
Status: ACTIVE | OUTPATIENT
Start: 2022-04-04 | End: 2022-04-04

## 2022-04-04 RX ORDER — NICOTINE POLACRILEX 4 MG
15 LOZENGE BUCCAL PRN
Status: CANCELLED | OUTPATIENT
Start: 2022-04-04

## 2022-04-04 RX ORDER — HYDRALAZINE HYDROCHLORIDE 20 MG/ML
10 INJECTION INTRAMUSCULAR; INTRAVENOUS EVERY 4 HOURS PRN
Status: DISCONTINUED | OUTPATIENT
Start: 2022-04-04 | End: 2022-04-04 | Stop reason: HOSPADM

## 2022-04-04 RX ORDER — HEPARIN SODIUM 1000 [USP'U]/ML
INJECTION, SOLUTION INTRAVENOUS; SUBCUTANEOUS PRN
Status: DISCONTINUED | OUTPATIENT
Start: 2022-04-04 | End: 2022-04-04 | Stop reason: HOSPADM

## 2022-04-04 RX ORDER — DEXTROSE MONOHYDRATE 25 G/50ML
25 INJECTION, SOLUTION INTRAVENOUS PRN
Status: CANCELLED | OUTPATIENT
Start: 2022-04-04

## 2022-04-04 RX ORDER — CLONIDINE HYDROCHLORIDE 0.1 MG/1
0.1 TABLET ORAL EVERY 4 HOURS PRN
Status: DISCONTINUED | OUTPATIENT
Start: 2022-04-04 | End: 2022-04-04 | Stop reason: HOSPADM

## 2022-04-04 RX ORDER — ASPIRIN 325 MG
325 TABLET ORAL ONCE
Status: DISCONTINUED | OUTPATIENT
Start: 2022-04-04 | End: 2022-04-04 | Stop reason: HOSPADM

## 2022-04-04 RX ORDER — GABAPENTIN 100 MG/1
300 CAPSULE ORAL ONCE
Status: CANCELLED | OUTPATIENT
Start: 2022-05-13

## 2022-04-04 RX ADMIN — SODIUM CHLORIDE: 9 INJECTION, SOLUTION INTRAVENOUS at 06:30

## 2022-04-04 ASSESSMENT — PAIN SCALES - GENERAL
PAINLEVEL_OUTOF10: 0

## 2022-04-19 NOTE — PLAN OF CARE
A user error has taken place: .   Denies pain. Left hip dressing clean, dry, intact. Hip precautions maintained. Has ambulated in hallway. Gait steady. States ready to go home later today.    Instructed on plan of care, pain management, call don't fall protocol, verbalized understandin

## 2022-05-11 ENCOUNTER — LAB SERVICES (OUTPATIENT)
Dept: LAB | Age: 82
End: 2022-05-11

## 2022-05-11 ENCOUNTER — HOSPITAL ENCOUNTER (OUTPATIENT)
Dept: GENERAL RADIOLOGY | Age: 82
Discharge: HOME | DRG: 235 | End: 2022-05-11
Attending: THORACIC SURGERY (CARDIOTHORACIC VASCULAR SURGERY)

## 2022-05-11 ENCOUNTER — HOSPITAL ENCOUNTER (OUTPATIENT)
Dept: PREADMISSION TESTING | Age: 82
Discharge: HOME | DRG: 235 | End: 2022-05-11
Attending: THORACIC SURGERY (CARDIOTHORACIC VASCULAR SURGERY)

## 2022-05-11 ENCOUNTER — HOSPITAL ENCOUNTER (OUTPATIENT)
Dept: LAB | Age: 82
Discharge: HOME | DRG: 235 | End: 2022-05-11
Attending: THORACIC SURGERY (CARDIOTHORACIC VASCULAR SURGERY)

## 2022-05-11 DIAGNOSIS — I25.10 CORONARY ARTERY DISEASE INVOLVING NATIVE HEART, UNSPECIFIED VESSEL OR LESION TYPE, UNSPECIFIED WHETHER ANGINA PRESENT: ICD-10-CM

## 2022-05-11 DIAGNOSIS — I25.10 CORONARY ARTERY DISEASE INVOLVING NATIVE CORONARY ARTERY OF NATIVE HEART WITHOUT ANGINA PECTORIS: ICD-10-CM

## 2022-05-11 DIAGNOSIS — Z01.810 PRE-OPERATIVE CARDIOVASCULAR EXAMINATION: Primary | ICD-10-CM

## 2022-05-11 LAB
ABO + RH BLD: NORMAL
ALBUMIN SERPL-MCNC: 3.6 G/DL (ref 3.6–5.1)
ALBUMIN/GLOB SERPL: 1 {RATIO} (ref 1–2.4)
ALP SERPL-CCNC: 48 UNITS/L (ref 45–117)
ALT SERPL-CCNC: 25 UNITS/L
ANION GAP SERPL CALC-SCNC: 9 MMOL/L (ref 10–20)
APPEARANCE UR: CLEAR
APTT PPP: 26 SEC (ref 22–30)
AST SERPL-CCNC: 22 UNITS/L
BILIRUB SERPL-MCNC: 0.9 MG/DL (ref 0.2–1)
BILIRUB UR QL STRIP: NEGATIVE
BLD GP AB SCN SERPL QL GEL: NEGATIVE
BUN SERPL-MCNC: 24 MG/DL (ref 6–20)
BUN/CREAT SERPL: 21 (ref 7–25)
CALCIUM SERPL-MCNC: 9.2 MG/DL (ref 8.4–10.2)
CHLORIDE SERPL-SCNC: 109 MMOL/L (ref 98–107)
CO2 SERPL-SCNC: 27 MMOL/L (ref 21–32)
COLOR UR: YELLOW
CREAT SERPL-MCNC: 1.14 MG/DL (ref 0.67–1.17)
DEPRECATED RDW RBC: 46.6 FL (ref 39–50)
ERYTHROCYTE [DISTWIDTH] IN BLOOD: 12.8 % (ref 11–15)
FASTING DURATION TIME PATIENT: ABNORMAL H
GFR SERPLBLD BASED ON 1.73 SQ M-ARVRAT: 65 ML/MIN
GLOBULIN SER-MCNC: 3.6 G/DL (ref 2–4)
GLUCOSE SERPL-MCNC: 92 MG/DL (ref 70–99)
GLUCOSE UR STRIP-MCNC: NEGATIVE MG/DL
HBA1C MFR BLD: 5.5 % (ref 4.5–5.6)
HCT VFR BLD CALC: 41.1 % (ref 39–51)
HGB BLD-MCNC: 13.5 G/DL (ref 13–17)
HGB UR QL STRIP: NEGATIVE
INR PPP: 1
KETONES UR STRIP-MCNC: NEGATIVE MG/DL
LEUKOCYTE ESTERASE UR QL STRIP: NEGATIVE
MCH RBC QN AUTO: 32.5 PG (ref 26–34)
MCHC RBC AUTO-ENTMCNC: 32.8 G/DL (ref 32–36.5)
MCV RBC AUTO: 99 FL (ref 78–100)
NITRITE UR QL STRIP: NEGATIVE
NRBC BLD MANUAL-RTO: 0 /100 WBC
PH UR STRIP: 6 [PH] (ref 5–7)
PLATELET # BLD AUTO: 164 K/MCL (ref 140–450)
POTASSIUM SERPL-SCNC: 4.4 MMOL/L (ref 3.4–5.1)
PROT SERPL-MCNC: 7.2 G/DL (ref 6.4–8.2)
PROT UR STRIP-MCNC: NEGATIVE MG/DL
PROTHROMBIN TIME: 11 SEC (ref 9.7–11.8)
RBC # BLD: 4.15 MIL/MCL (ref 4.5–5.9)
SARS-COV-2 RNA RESP QL NAA+PROBE: NOT DETECTED
SERVICE CMNT-IMP: NORMAL
SERVICE CMNT-IMP: NORMAL
SODIUM SERPL-SCNC: 141 MMOL/L (ref 135–145)
SP GR UR STRIP: 1.02 (ref 1–1.03)
TYPE AND SCREEN EXPIRATION DATE: NORMAL
UROBILINOGEN UR STRIP-MCNC: 0.2 MG/DL
WBC # BLD: 6.5 K/MCL (ref 4.2–11)

## 2022-05-11 PROCEDURE — 85730 THROMBOPLASTIN TIME PARTIAL: CPT

## 2022-05-11 PROCEDURE — 83036 HEMOGLOBIN GLYCOSYLATED A1C: CPT

## 2022-05-11 PROCEDURE — U0005 INFEC AGEN DETEC AMPLI PROBE: HCPCS

## 2022-05-11 PROCEDURE — 85027 COMPLETE CBC AUTOMATED: CPT

## 2022-05-11 PROCEDURE — 80053 COMPREHEN METABOLIC PANEL: CPT

## 2022-05-11 PROCEDURE — 85610 PROTHROMBIN TIME: CPT

## 2022-05-11 PROCEDURE — 81003 URINALYSIS AUTO W/O SCOPE: CPT

## 2022-05-11 PROCEDURE — 36415 COLL VENOUS BLD VENIPUNCTURE: CPT

## 2022-05-11 PROCEDURE — 71046 X-RAY EXAM CHEST 2 VIEWS: CPT

## 2022-05-11 PROCEDURE — 86850 RBC ANTIBODY SCREEN: CPT

## 2022-05-11 RX ORDER — ROSUVASTATIN CALCIUM 40 MG/1
40 TABLET, COATED ORAL AT BEDTIME
COMMUNITY

## 2022-05-11 ASSESSMENT — ACTIVITIES OF DAILY LIVING (ADL)
SENSORY_SUPPORT_DEVICES: EYEGLASSES;HEARING AIDS;DENTURES
ADL_SCORE: 12
ADL_BEFORE_ADMISSION: INDEPENDENT

## 2022-05-12 ENCOUNTER — ANESTHESIA EVENT (OUTPATIENT)
Dept: SURGERY | Age: 82
DRG: 235 | End: 2022-05-12

## 2022-05-13 ENCOUNTER — HOSPITAL ENCOUNTER (INPATIENT)
Age: 82
LOS: 5 days | Discharge: HOME HEALTH | DRG: 235 | End: 2022-05-18
Attending: THORACIC SURGERY (CARDIOTHORACIC VASCULAR SURGERY) | Admitting: THORACIC SURGERY (CARDIOTHORACIC VASCULAR SURGERY)

## 2022-05-13 ENCOUNTER — ANESTHESIA (OUTPATIENT)
Dept: SURGERY | Age: 82
DRG: 235 | End: 2022-05-13

## 2022-05-13 DIAGNOSIS — I25.10 CAD, MULTIPLE VESSEL: ICD-10-CM

## 2022-05-13 DIAGNOSIS — G89.18 POST-OP PAIN: ICD-10-CM

## 2022-05-13 DIAGNOSIS — Z95.1 S/P CABG (CORONARY ARTERY BYPASS GRAFT): ICD-10-CM

## 2022-05-13 DIAGNOSIS — I25.10 CORONARY ARTERY DISEASE INVOLVING NATIVE CORONARY ARTERY OF NATIVE HEART WITHOUT ANGINA PECTORIS: ICD-10-CM

## 2022-05-13 DIAGNOSIS — N17.9 AKI (ACUTE KIDNEY INJURY) (CMD): ICD-10-CM

## 2022-05-13 DIAGNOSIS — I25.10 CORONARY ARTERY DISEASE INVOLVING NATIVE HEART, UNSPECIFIED VESSEL OR LESION TYPE, UNSPECIFIED WHETHER ANGINA PRESENT: Primary | ICD-10-CM

## 2022-05-13 LAB
ANION GAP SERPL CALC-SCNC: 9 MMOL/L (ref 10–20)
APTT PPP: 30 SEC (ref 22–30)
APTT PPP: 34 SEC (ref 22–30)
ATRIAL RATE (BPM): 90
BASE EXCESS / DEFICIT, ARTERIAL - RESPIRATORY: -1 MMOL/L (ref -2–3)
BASE EXCESS / DEFICIT, ARTERIAL - RESPIRATORY: -1 MMOL/L (ref -2–3)
BASE EXCESS / DEFICIT, ARTERIAL - RESPIRATORY: -2 MMOL/L (ref -2–3)
BASE EXCESS / DEFICIT, ARTERIAL - RESPIRATORY: -2 MMOL/L (ref -2–3)
BASE EXCESS / DEFICIT, ARTERIAL - RESPIRATORY: -3 MMOL/L (ref -2–3)
BASE EXCESS / DEFICIT, ARTERIAL - RESPIRATORY: 0 MMOL/L (ref -2–3)
BASE EXCESS / DEFICIT, ARTERIAL - RESPIRATORY: 1 MMOL/L (ref -2–3)
BASE EXCESS / DEFICIT, MIXED VENOUS - RESPIRATORY: -3 MMOL/L
BASE EXCESS / DEFICIT, VENOUS - RESPIRATORY: 3 MMOL/L (ref -2–2)
BDY SITE: ABNORMAL
BODY TEMPERATURE: 32 DEGREES
BODY TEMPERATURE: 32 DEGREES
BODY TEMPERATURE: 36.5 DEGREES
BODY TEMPERATURE: 36.7 DEGREES
BODY TEMPERATURE: 36.7 DEGREES
BODY TEMPERATURE: 36.9 DEGREES
BODY TEMPERATURE: 37 DEGREES
BUN SERPL-MCNC: 16 MG/DL (ref 6–20)
BUN/CREAT SERPL: 16 (ref 7–25)
CA-I BLD-SCNC: 1.02 MMOL/L (ref 1.15–1.29)
CA-I BLD-SCNC: 1.03 MMOL/L (ref 1.15–1.29)
CA-I BLD-SCNC: 1.04 MMOL/L (ref 1.15–1.29)
CA-I BLD-SCNC: 1.17 MMOL/L (ref 1.15–1.29)
CA-I BLD-SCNC: 1.19 MMOL/L (ref 1.15–1.29)
CA-I BLD-SCNC: 1.19 MMOL/L (ref 1.15–1.29)
CA-I BLD-SCNC: 1.21 MMOL/L (ref 1.15–1.29)
CA-I BLD-SCNC: 1.23 MMOL/L (ref 1.15–1.29)
CA-I BLD-SCNC: 1.23 MMOL/L (ref 1.15–1.29)
CALCIUM SERPL-MCNC: 8.3 MG/DL (ref 8.4–10.2)
CHLORIDE BLD-SCNC: 109 MMOL/L (ref 98–107)
CHLORIDE BLD-SCNC: 109 MMOL/L (ref 98–107)
CHLORIDE BLD-SCNC: 111 MMOL/L (ref 98–107)
CHLORIDE BLD-SCNC: 112 MMOL/L (ref 98–107)
CHLORIDE BLD-SCNC: 112 MMOL/L (ref 98–107)
CHLORIDE BLD-SCNC: 113 MMOL/L (ref 98–107)
CHLORIDE SERPL-SCNC: 115 MMOL/L (ref 98–107)
CO2 SERPL-SCNC: 24 MMOL/L (ref 21–32)
COHGB MFR BLDMV: 1 %
COHGB MFR BLDV: 0.8 %
COHGB MFR BLDV: 0.8 %
COHGB MFR BLDV: 1 %
COHGB MFR BLDV: 1.1 %
COHGB MFR BLDV: 1.1 %
COHGB MFR BLDV: 1.2 %
COHGB MFR BLDV: 1.4 %
COHGB MFR BLDV: 2.1 %
CONDITION: ABNORMAL
CREAT SERPL-MCNC: 0.97 MG/DL (ref 0.67–1.17)
DEPRECATED RDW RBC: 48 FL (ref 39–50)
ERYTHROCYTE [DISTWIDTH] IN BLOOD: 12.9 % (ref 11–15)
FASTING DURATION TIME PATIENT: ABNORMAL H
FIO2 ON VENT: 36 %
FIO2 ON VENT: 50 %
FIO2 ON VENT: 50 %
GFR SERPLBLD BASED ON 1.73 SQ M-ARVRAT: 78 ML/MIN
GLUCOSE BLD-MCNC: 102 MG/DL (ref 65–99)
GLUCOSE BLD-MCNC: 104 MG/DL (ref 65–99)
GLUCOSE BLD-MCNC: 125 MG/DL (ref 65–99)
GLUCOSE BLD-MCNC: 126 MG/DL (ref 65–99)
GLUCOSE BLD-MCNC: 133 MG/DL (ref 65–99)
GLUCOSE BLD-MCNC: 137 MG/DL (ref 65–99)
GLUCOSE BLD-MCNC: 91 MG/DL (ref 65–99)
GLUCOSE BLD-MCNC: 91 MG/DL (ref 65–99)
GLUCOSE BLD-MCNC: 95 MG/DL (ref 65–99)
GLUCOSE BLDC GLUCOMTR-MCNC: 124 MG/DL (ref 70–99)
GLUCOSE BLDC GLUCOMTR-MCNC: 130 MG/DL (ref 70–99)
GLUCOSE BLDC GLUCOMTR-MCNC: 139 MG/DL (ref 70–99)
GLUCOSE BLDC GLUCOMTR-MCNC: 140 MG/DL (ref 70–99)
GLUCOSE BLDC GLUCOMTR-MCNC: 144 MG/DL (ref 70–99)
GLUCOSE BLDC GLUCOMTR-MCNC: 151 MG/DL (ref 70–99)
GLUCOSE SERPL-MCNC: 103 MG/DL (ref 70–99)
HBA1C MFR BLD: 5.2 % (ref 4.5–5.6)
HCO3 BLDA-SCNC: 23 MMOL/L (ref 22–28)
HCO3 BLDA-SCNC: 23 MMOL/L (ref 22–28)
HCO3 BLDA-SCNC: 24 MMOL/L (ref 22–28)
HCO3 BLDA-SCNC: 24 MMOL/L (ref 22–28)
HCO3 BLDA-SCNC: 25 MMOL/L (ref 22–28)
HCO3 BLDA-SCNC: 25 MMOL/L (ref 22–28)
HCO3 BLDA-SCNC: 26 MMOL/L (ref 22–28)
HCO3 BLDMV-SCNC: 23 MMOL/L
HCO3 BLDV-SCNC: 28.9 MMOL/L (ref 22–28)
HCT VFR BLD CALC: 28 % (ref 39–51)
HCT VFR BLD CALC: 29 % (ref 39–51)
HCT VFR BLD CALC: 32 % (ref 39–51)
HCT VFR BLD CALC: 32 % (ref 39–51)
HCT VFR BLD CALC: 32.5 % (ref 39–51)
HCT VFR BLD CALC: 34 % (ref 39–51)
HCT VFR BLD CALC: 34 % (ref 39–51)
HCT VFR BLD CALC: 38 % (ref 39–51)
HGB BLD-MCNC: 10.5 G/DL (ref 13–17)
HGB BLD-MCNC: 10.6 G/DL (ref 13–17)
HGB BLD-MCNC: 10.7 G/DL (ref 13–17)
HGB BLD-MCNC: 11.2 G/DL (ref 13–17)
HGB BLD-MCNC: 11.4 G/DL (ref 13–17)
HGB BLD-MCNC: 12.7 G/DL (ref 13–17)
HGB BLD-MCNC: 9.2 G/DL (ref 13–17)
HGB BLD-MCNC: 9.4 G/DL (ref 13–17)
HGB BLD-MCNC: 9.4 G/DL (ref 13–17)
HGB BLD-MCNC: 9.7 G/DL (ref 13–17)
HOROWITZ INDEX BLDA+IHG-RTO: 288 MM[HG] (ref 300–500)
HOROWITZ INDEX BLDA+IHG-RTO: 494 MM[HG] (ref 300–500)
INR PPP: 1.2
INR PPP: 1.4
MAGNESIUM SERPL-MCNC: 2.4 MG/DL (ref 1.7–2.4)
MCH RBC QN AUTO: 32.8 PG (ref 26–34)
MCHC RBC AUTO-ENTMCNC: 32.6 G/DL (ref 32–36.5)
MCV RBC AUTO: 100.6 FL (ref 78–100)
METHGB MFR BLDMV: 0.1 %
METHGB MFR BLDMV: 0.4 %
METHGB MFR BLDMV: 0.4 %
METHGB MFR BLDMV: 0.5 %
METHGB MFR BLDMV: 0.6 %
METHGB MFR BLDMV: 0.7 %
METHGB MFR BLDMV: 0.7 %
METHGB MFR BLDMV: 0.8 %
METHGB MFR BLDMV: 0.9 %
NRBC BLD MANUAL-RTO: 0 /100 WBC
OXYHGB MFR BLDA: 96.9 % (ref 94–98)
OXYHGB MFR BLDA: 97.2 % (ref 94–98)
OXYHGB MFR BLDA: 97.8 % (ref 94–98)
OXYHGB MFR BLDA: 97.9 % (ref 94–98)
OXYHGB MFR BLDA: 98 % (ref 94–98)
OXYHGB MFR BLDA: 98 % (ref 94–98)
OXYHGB MFR BLDA: 98.2 % (ref 94–98)
OXYHGB MFR BLDMV: 77.4 %
OXYHGB MFR BLDV: 86.3 % (ref 60–80)
P AXIS (DEGREES): 59
PCO2 BLDA: 34 MM HG (ref 35–48)
PCO2 BLDA: 35 MM HG (ref 35–48)
PCO2 BLDA: 36 MM HG (ref 35–48)
PCO2 BLDA: 42 MM HG (ref 35–48)
PCO2 BLDA: 43 MM HG (ref 35–48)
PCO2 BLDA: 44 MM HG (ref 35–48)
PCO2 BLDA: 46 MM HG (ref 35–48)
PCO2 BLDMV: 45 MM HG
PCO2 BLDV: 40 MM HG (ref 41–54)
PH BLDA: 7.32 UNITS (ref 7.35–7.45)
PH BLDA: 7.33 UNITS (ref 7.35–7.45)
PH BLDA: 7.36 UNITS (ref 7.35–7.45)
PH BLDA: 7.38 UNITS (ref 7.35–7.45)
PH BLDA: 7.42 UNITS (ref 7.35–7.45)
PH BLDA: 7.42 UNITS (ref 7.35–7.45)
PH BLDA: 7.47 UNITS (ref 7.35–7.45)
PH BLDMV: 7.31 UNITS
PH BLDV: 7.44 UNITS (ref 7.35–7.45)
PLATELET # BLD AUTO: 114 K/MCL (ref 140–450)
PLATELET # BLD AUTO: 118 K/MCL (ref 140–450)
PO2 BLDA: 142 MM HG (ref 83–108)
PO2 BLDA: 147 MM HG (ref 83–108)
PO2 BLDA: 178 MM HG (ref 83–108)
PO2 BLDA: 259 MM HG (ref 83–108)
PO2 BLDA: 278 MM HG (ref 83–108)
PO2 BLDA: 389 MM HG (ref 83–108)
PO2 BLDA: 457 MM HG (ref 83–108)
PO2 BLDMV: 48 MM HG
PO2 BLDV: 38 MM HG (ref 35–42)
POTASSIUM BLD-SCNC: 3.7 MMOL/L (ref 3.4–5.1)
POTASSIUM BLD-SCNC: 3.8 MMOL/L (ref 3.4–5.1)
POTASSIUM BLD-SCNC: 4.1 MMOL/L (ref 3.4–5.1)
POTASSIUM BLD-SCNC: 4.1 MMOL/L (ref 3.4–5.1)
POTASSIUM BLD-SCNC: 4.2 MMOL/L (ref 3.4–5.1)
POTASSIUM BLD-SCNC: 4.4 MMOL/L (ref 3.4–5.1)
POTASSIUM BLD-SCNC: 4.7 MMOL/L (ref 3.4–5.1)
POTASSIUM BLD-SCNC: 4.9 MMOL/L (ref 3.4–5.1)
POTASSIUM BLD-SCNC: 5.1 MMOL/L (ref 3.4–5.1)
POTASSIUM SERPL-SCNC: 4.1 MMOL/L (ref 3.4–5.1)
PR-INTERVAL (MSEC): 224
PROTHROMBIN TIME: 13 SEC (ref 9.7–11.8)
PROTHROMBIN TIME: 15.1 SEC (ref 9.7–11.8)
QRS-INTERVAL (MSEC): 132
QT-INTERVAL (MSEC): 406
QTC: 497
R AXIS (DEGREES): 66
RAINBOW EXTRA TUBES HOLD SPECIMEN: NORMAL
RBC # BLD: 3.23 MIL/MCL (ref 4.5–5.9)
REPORT TEXT: NORMAL
SAO2 % BLDA: 100 % (ref 95–99)
SAO2 % BLDA: 14 % (ref 15–23)
SAO2 % BLDA: 14 % (ref 15–23)
SAO2 % BLDA: 15 % (ref 15–23)
SAO2 % BLDA: 15 % (ref 15–23)
SAO2 % BLDA: 16 % (ref 15–23)
SAO2 % BLDA: 16 % (ref 15–23)
SAO2 % BLDA: 18 % (ref 15–23)
SAO2 % BLDA: 99 % (ref 95–99)
SAO2 % BLDMV: 12 %
SAO2 DF BLDMV: 79 %
SAO2 DF BLDV: 89 % (ref 60–80)
SERVICE CMNT-IMP: 159 MG/DL (ref 190–425)
SODIUM BLD-SCNC: 136 MMOL/L (ref 135–145)
SODIUM BLD-SCNC: 137 MMOL/L (ref 135–145)
SODIUM BLD-SCNC: 138 MMOL/L (ref 135–145)
SODIUM BLD-SCNC: 139 MMOL/L (ref 135–145)
SODIUM SERPL-SCNC: 144 MMOL/L (ref 135–145)
T AXIS (DEGREES): -5
VENTRICULAR RATE EKG/MIN (BPM): 90
WBC # BLD: 14 K/MCL (ref 4.2–11)

## 2022-05-13 PROCEDURE — P9045 ALBUMIN (HUMAN), 5%, 250 ML: HCPCS | Performed by: ANESTHESIOLOGY

## 2022-05-13 PROCEDURE — 84295 ASSAY OF SERUM SODIUM: CPT

## 2022-05-13 PROCEDURE — 85018 HEMOGLOBIN: CPT

## 2022-05-13 PROCEDURE — 06BQ4ZZ EXCISION OF LEFT SAPHENOUS VEIN, PERCUTANEOUS ENDOSCOPIC APPROACH: ICD-10-PCS | Performed by: THORACIC SURGERY (CARDIOTHORACIC VASCULAR SURGERY)

## 2022-05-13 PROCEDURE — 10002016 HB COUNTER INCENTIVE SPIROMETRY

## 2022-05-13 PROCEDURE — 85610 PROTHROMBIN TIME: CPT | Performed by: THORACIC SURGERY (CARDIOTHORACIC VASCULAR SURGERY)

## 2022-05-13 PROCEDURE — 10002803 HB RX 637: Performed by: PHYSICIAN ASSISTANT

## 2022-05-13 PROCEDURE — 85384 FIBRINOGEN ACTIVITY: CPT | Performed by: THORACIC SURGERY (CARDIOTHORACIC VASCULAR SURGERY)

## 2022-05-13 PROCEDURE — 82330 ASSAY OF CALCIUM: CPT

## 2022-05-13 PROCEDURE — P9045 ALBUMIN (HUMAN), 5%, 250 ML: HCPCS

## 2022-05-13 PROCEDURE — 5A1221Z PERFORMANCE OF CARDIAC OUTPUT, CONTINUOUS: ICD-10-PCS | Performed by: THORACIC SURGERY (CARDIOTHORACIC VASCULAR SURGERY)

## 2022-05-13 PROCEDURE — 10002801 HB RX 250 W/O HCPCS: Performed by: NURSE ANESTHETIST, CERTIFIED REGISTERED

## 2022-05-13 PROCEDURE — 10004651 HB RX, NO CHARGE ITEM: Performed by: PHYSICIAN ASSISTANT

## 2022-05-13 PROCEDURE — 10002807 HB RX 258: Performed by: THORACIC SURGERY (CARDIOTHORACIC VASCULAR SURGERY)

## 2022-05-13 PROCEDURE — 93005 ELECTROCARDIOGRAM TRACING: CPT | Performed by: PHYSICIAN ASSISTANT

## 2022-05-13 PROCEDURE — P9045 ALBUMIN (HUMAN), 5%, 250 ML: HCPCS | Performed by: PHYSICIAN ASSISTANT

## 2022-05-13 PROCEDURE — 02110Z3 BYPASS CORONARY ARTERY, TWO ARTERIES FROM CORONARY ARTERY, OPEN APPROACH: ICD-10-PCS | Performed by: THORACIC SURGERY (CARDIOTHORACIC VASCULAR SURGERY)

## 2022-05-13 PROCEDURE — 10002803 HB RX 637: Performed by: NURSE PRACTITIONER

## 2022-05-13 PROCEDURE — 13000003 HB ANESTHESIA  GENERAL EA ADD MINUTE: Performed by: THORACIC SURGERY (CARDIOTHORACIC VASCULAR SURGERY)

## 2022-05-13 PROCEDURE — 10002800 HB RX 250 W HCPCS: Performed by: NURSE PRACTITIONER

## 2022-05-13 PROCEDURE — 10002800 HB RX 250 W HCPCS: Performed by: PHYSICIAN ASSISTANT

## 2022-05-13 PROCEDURE — 10002807 HB RX 258: Performed by: NURSE ANESTHETIST, CERTIFIED REGISTERED

## 2022-05-13 PROCEDURE — 80048 BASIC METABOLIC PNL TOTAL CA: CPT | Performed by: PHYSICIAN ASSISTANT

## 2022-05-13 PROCEDURE — 021009W BYPASS CORONARY ARTERY, ONE ARTERY FROM AORTA WITH AUTOLOGOUS VENOUS TISSUE, OPEN APPROACH: ICD-10-PCS | Performed by: THORACIC SURGERY (CARDIOTHORACIC VASCULAR SURGERY)

## 2022-05-13 PROCEDURE — 82947 ASSAY GLUCOSE BLOOD QUANT: CPT

## 2022-05-13 PROCEDURE — 83735 ASSAY OF MAGNESIUM: CPT | Performed by: PHYSICIAN ASSISTANT

## 2022-05-13 PROCEDURE — 85730 THROMBOPLASTIN TIME PARTIAL: CPT | Performed by: THORACIC SURGERY (CARDIOTHORACIC VASCULAR SURGERY)

## 2022-05-13 PROCEDURE — 10006023 HB SUPPLY 272: Performed by: THORACIC SURGERY (CARDIOTHORACIC VASCULAR SURGERY)

## 2022-05-13 PROCEDURE — 10002803 HB RX 637: Performed by: SPECIALIST

## 2022-05-13 PROCEDURE — 5A1223Z PERFORMANCE OF CARDIAC PACING, CONTINUOUS: ICD-10-PCS | Performed by: THORACIC SURGERY (CARDIOTHORACIC VASCULAR SURGERY)

## 2022-05-13 PROCEDURE — 83036 HEMOGLOBIN GLYCOSYLATED A1C: CPT | Performed by: NURSE PRACTITIONER

## 2022-05-13 PROCEDURE — C1768 GRAFT, VASCULAR: HCPCS | Performed by: THORACIC SURGERY (CARDIOTHORACIC VASCULAR SURGERY)

## 2022-05-13 PROCEDURE — 85730 THROMBOPLASTIN TIME PARTIAL: CPT | Performed by: PHYSICIAN ASSISTANT

## 2022-05-13 PROCEDURE — 10002807 HB RX 258: Performed by: PHYSICIAN ASSISTANT

## 2022-05-13 PROCEDURE — 85610 PROTHROMBIN TIME: CPT | Performed by: PHYSICIAN ASSISTANT

## 2022-05-13 PROCEDURE — 13000002 HB ANESTHESIA  GENERAL  S/U + 1ST 15 MIN: Performed by: THORACIC SURGERY (CARDIOTHORACIC VASCULAR SURGERY)

## 2022-05-13 PROCEDURE — 10006027 HB SUPPLY 278: Performed by: THORACIC SURGERY (CARDIOTHORACIC VASCULAR SURGERY)

## 2022-05-13 PROCEDURE — C1713 ANCHOR/SCREW BN/BN,TIS/BN: HCPCS | Performed by: THORACIC SURGERY (CARDIOTHORACIC VASCULAR SURGERY)

## 2022-05-13 PROCEDURE — 13001086 HB INCENTIVE SPIROMETER W INSTRUCT

## 2022-05-13 PROCEDURE — 84132 ASSAY OF SERUM POTASSIUM: CPT

## 2022-05-13 PROCEDURE — 10002801 HB RX 250 W/O HCPCS

## 2022-05-13 PROCEDURE — 10002800 HB RX 250 W HCPCS: Performed by: ANESTHESIOLOGY

## 2022-05-13 PROCEDURE — 10000008 HB ROOM CHARGE ICU OR CCU

## 2022-05-13 PROCEDURE — 82375 ASSAY CARBOXYHB QUANT: CPT

## 2022-05-13 PROCEDURE — 10004180 HB COUNTER-TRANSPORT

## 2022-05-13 PROCEDURE — 85049 AUTOMATED PLATELET COUNT: CPT | Performed by: THORACIC SURGERY (CARDIOTHORACIC VASCULAR SURGERY)

## 2022-05-13 PROCEDURE — 94002 VENT MGMT INPAT INIT DAY: CPT

## 2022-05-13 PROCEDURE — 13000105 HB CARDIO-THORACIC MAJOR COMPLEX CASE EA ADD MINUTE: Performed by: THORACIC SURGERY (CARDIOTHORACIC VASCULAR SURGERY)

## 2022-05-13 PROCEDURE — 10002019 HB COUNTER RESP ASSESSMENT

## 2022-05-13 PROCEDURE — 13000104 HB CARDIO-THORACIC MAJOR COMPLEX CASE  S/U + 1ST 15 MIN: Performed by: THORACIC SURGERY (CARDIOTHORACIC VASCULAR SURGERY)

## 2022-05-13 PROCEDURE — 02HK0JZ INSERTION OF PACEMAKER LEAD INTO RIGHT VENTRICLE, OPEN APPROACH: ICD-10-PCS | Performed by: THORACIC SURGERY (CARDIOTHORACIC VASCULAR SURGERY)

## 2022-05-13 PROCEDURE — 10002801 HB RX 250 W/O HCPCS: Performed by: THORACIC SURGERY (CARDIOTHORACIC VASCULAR SURGERY)

## 2022-05-13 PROCEDURE — 10002800 HB RX 250 W HCPCS: Performed by: THORACIC SURGERY (CARDIOTHORACIC VASCULAR SURGERY)

## 2022-05-13 PROCEDURE — 02100Z9 BYPASS CORONARY ARTERY, ONE ARTERY FROM LEFT INTERNAL MAMMARY, OPEN APPROACH: ICD-10-PCS | Performed by: THORACIC SURGERY (CARDIOTHORACIC VASCULAR SURGERY)

## 2022-05-13 PROCEDURE — 94150 VITAL CAPACITY TEST: CPT

## 2022-05-13 PROCEDURE — 83050 HGB METHEMOGLOBIN QUAN: CPT

## 2022-05-13 PROCEDURE — 10002800 HB RX 250 W HCPCS

## 2022-05-13 PROCEDURE — 13000059 HB CELL SAVER

## 2022-05-13 PROCEDURE — C1729 CATH, DRAINAGE: HCPCS | Performed by: THORACIC SURGERY (CARDIOTHORACIC VASCULAR SURGERY)

## 2022-05-13 PROCEDURE — 85027 COMPLETE CBC AUTOMATED: CPT | Performed by: PHYSICIAN ASSISTANT

## 2022-05-13 PROCEDURE — 10002801 HB RX 250 W/O HCPCS: Performed by: PHYSICIAN ASSISTANT

## 2022-05-13 PROCEDURE — 10002800 HB RX 250 W HCPCS: Performed by: NURSE ANESTHETIST, CERTIFIED REGISTERED

## 2022-05-13 DEVICE — PLATE BN STRNL 8 HOLE X NS LF: Type: IMPLANTABLE DEVICE | Site: STERNUM | Status: FUNCTIONAL

## 2022-05-13 DEVICE — BARD® PTFE FELT PLEDGETS, (OVAL), 4.8 MM X 6 MM
Type: IMPLANTABLE DEVICE | Site: CHEST | Status: FUNCTIONAL
Brand: BARD® PTFE FELT PLEDGETS

## 2022-05-13 DEVICE — SCREW BN 2.3MM 12MM SELF DRILL LOCK AX STAB NS LF: Type: IMPLANTABLE DEVICE | Site: STERNUM | Status: FUNCTIONAL

## 2022-05-13 DEVICE — SCREW BN 2.3MM 10MM SELF DRILL LOCK AX STAB NS LF: Type: IMPLANTABLE DEVICE | Site: STERNUM | Status: FUNCTIONAL

## 2022-05-13 DEVICE — CLIP INTERNAL MED CHEVRON 24 CRTDG LIGATE TRIANGULATE XSECT: Type: IMPLANTABLE DEVICE | Site: CHEST | Status: FUNCTIONAL

## 2022-05-13 DEVICE — FLOSEAL HEMOSTATIC MATRIX, 10ML
Type: IMPLANTABLE DEVICE | Site: CHEST | Status: FUNCTIONAL
Brand: FLOSEAL HEMOSTATIC MATRIX

## 2022-05-13 DEVICE — PLATE BN STRNL LOWPRFL L: Type: IMPLANTABLE DEVICE | Site: STERNUM | Status: FUNCTIONAL

## 2022-05-13 DEVICE — HORIZON TI SMALL 24 CLIPS/POUCH
Type: IMPLANTABLE DEVICE | Site: CHEST | Status: FUNCTIONAL
Brand: WECK

## 2022-05-13 DEVICE — HORIZON TI SMALL RED  24 CLIPS/POUCH
Type: IMPLANTABLE DEVICE | Site: CHEST | Status: FUNCTIONAL
Brand: WECK

## 2022-05-13 DEVICE — SCREW BN 2.3MM 14MM AXS SELF DRILL LOCK NS STRNL LF: Type: IMPLANTABLE DEVICE | Site: STERNUM | Status: FUNCTIONAL

## 2022-05-13 RX ORDER — HYDROCODONE BITARTRATE AND ACETAMINOPHEN 5; 325 MG/1; MG/1
2 TABLET ORAL EVERY 6 HOURS PRN
Status: DISCONTINUED | OUTPATIENT
Start: 2022-05-13 | End: 2022-05-18 | Stop reason: HOSPADM

## 2022-05-13 RX ORDER — ALBUMIN, HUMAN INJ 5% 5 %
SOLUTION INTRAVENOUS CONTINUOUS PRN
Status: DISCONTINUED | OUTPATIENT
Start: 2022-05-13 | End: 2022-05-13

## 2022-05-13 RX ORDER — NICOTINE POLACRILEX 4 MG
15 LOZENGE BUCCAL PRN
Status: DISCONTINUED | OUTPATIENT
Start: 2022-05-13 | End: 2022-05-18 | Stop reason: HOSPADM

## 2022-05-13 RX ORDER — FAMOTIDINE 10 MG/ML
20 INJECTION, SOLUTION INTRAVENOUS DAILY
Status: DISCONTINUED | OUTPATIENT
Start: 2022-05-13 | End: 2022-05-14

## 2022-05-13 RX ORDER — CHLORHEXIDINE GLUCONATE ORAL RINSE 1.2 MG/ML
15 SOLUTION DENTAL EVERY 12 HOURS SCHEDULED
Status: DISCONTINUED | OUTPATIENT
Start: 2022-05-13 | End: 2022-05-13

## 2022-05-13 RX ORDER — CHOLECALCIFEROL (VITAMIN D3) 125 MCG
1000 CAPSULE ORAL DAILY
Status: DISCONTINUED | OUTPATIENT
Start: 2022-05-14 | End: 2022-05-18 | Stop reason: HOSPADM

## 2022-05-13 RX ORDER — ALBUMIN, HUMAN INJ 5% 5 %
12.5 SOLUTION INTRAVENOUS ONCE
Status: COMPLETED | OUTPATIENT
Start: 2022-05-13 | End: 2022-05-13

## 2022-05-13 RX ORDER — GABAPENTIN 300 MG/1
300 CAPSULE ORAL DAILY
Status: DISCONTINUED | OUTPATIENT
Start: 2022-05-14 | End: 2022-05-15

## 2022-05-13 RX ORDER — PROPOFOL 10 MG/ML
INJECTION, EMULSION INTRAVENOUS PRN
Status: DISCONTINUED | OUTPATIENT
Start: 2022-05-13 | End: 2022-05-13

## 2022-05-13 RX ORDER — ALBUMIN, HUMAN INJ 5% 5 %
SOLUTION INTRAVENOUS
Status: COMPLETED
Start: 2022-05-13 | End: 2022-05-13

## 2022-05-13 RX ORDER — HEPARIN SODIUM 1000 [USP'U]/ML
INJECTION, SOLUTION INTRAVENOUS; SUBCUTANEOUS PRN
Status: DISCONTINUED | OUTPATIENT
Start: 2022-05-13 | End: 2022-05-13

## 2022-05-13 RX ORDER — ASPIRIN 300 MG/1
300 SUPPOSITORY RECTAL ONCE
Status: COMPLETED | OUTPATIENT
Start: 2022-05-13 | End: 2022-05-13

## 2022-05-13 RX ORDER — AMOXICILLIN 250 MG
2 CAPSULE ORAL DAILY
Status: DISCONTINUED | OUTPATIENT
Start: 2022-05-14 | End: 2022-05-18 | Stop reason: HOSPADM

## 2022-05-13 RX ORDER — KETAMINE HCL IN NACL, ISO-OSM 100MG/10ML
SYRINGE (ML) INJECTION PRN
Status: DISCONTINUED | OUTPATIENT
Start: 2022-05-13 | End: 2022-05-13

## 2022-05-13 RX ORDER — DEXTROSE MONOHYDRATE, SODIUM CHLORIDE, AND POTASSIUM CHLORIDE 50; 2.98; 4.5 G/1000ML; G/1000ML; G/1000ML
INJECTION, SOLUTION INTRAVENOUS CONTINUOUS PRN
Status: DISCONTINUED | OUTPATIENT
Start: 2022-05-13 | End: 2022-05-14

## 2022-05-13 RX ORDER — DEXTROSE AND SODIUM CHLORIDE 5; .45 G/100ML; G/100ML
INJECTION, SOLUTION INTRAVENOUS CONTINUOUS PRN
Status: DISCONTINUED | OUTPATIENT
Start: 2022-05-13 | End: 2022-05-14

## 2022-05-13 RX ORDER — NICARDIPINE HYDROCHLORIDE 2.5 MG/ML
INJECTION INTRAVENOUS PRN
Status: DISCONTINUED | OUTPATIENT
Start: 2022-05-13 | End: 2022-05-13

## 2022-05-13 RX ORDER — MAGNESIUM HYDROXIDE 1200 MG/15ML
LIQUID ORAL PRN
Status: DISCONTINUED | OUTPATIENT
Start: 2022-05-13 | End: 2022-05-13

## 2022-05-13 RX ORDER — SODIUM CHLORIDE, SODIUM GLUCONATE, SODIUM ACETATE, POTASSIUM CHLORIDE AND MAGNESIUM CHLORIDE 526; 502; 368; 37; 30 MG/100ML; MG/100ML; MG/100ML; MG/100ML; MG/100ML
INJECTION, SOLUTION INTRAVENOUS CONTINUOUS PRN
Status: DISCONTINUED | OUTPATIENT
Start: 2022-05-13 | End: 2022-05-13

## 2022-05-13 RX ORDER — MAGNESIUM SULFATE HEPTAHYDRATE 500 MG/ML
INJECTION, SOLUTION INTRAMUSCULAR; INTRAVENOUS PRN
Status: DISCONTINUED | OUTPATIENT
Start: 2022-05-13 | End: 2022-05-13

## 2022-05-13 RX ORDER — CHLORHEXIDINE GLUCONATE ORAL RINSE 1.2 MG/ML
15 SOLUTION DENTAL PRN
Status: DISCONTINUED | OUTPATIENT
Start: 2022-05-13 | End: 2022-05-13

## 2022-05-13 RX ORDER — NITROGLYCERIN 20 MG/100ML
0-150 INJECTION INTRAVENOUS CONTINUOUS
Status: DISCONTINUED | OUTPATIENT
Start: 2022-05-13 | End: 2022-05-14

## 2022-05-13 RX ORDER — NOREPINEPHRINE BITARTRATE 0.03 MG/ML
0-4 INJECTION, SOLUTION INTRAVENOUS CONTINUOUS
Status: DISCONTINUED | OUTPATIENT
Start: 2022-05-13 | End: 2022-05-14

## 2022-05-13 RX ORDER — NICOTINE POLACRILEX 4 MG
30 LOZENGE BUCCAL PRN
Status: DISCONTINUED | OUTPATIENT
Start: 2022-05-13 | End: 2022-05-18 | Stop reason: HOSPADM

## 2022-05-13 RX ORDER — ACETAMINOPHEN 325 MG/1
650 TABLET ORAL EVERY 4 HOURS PRN
Status: DISCONTINUED | OUTPATIENT
Start: 2022-05-13 | End: 2022-05-18 | Stop reason: HOSPADM

## 2022-05-13 RX ORDER — POLYETHYLENE GLYCOL 3350 17 G/17G
17 POWDER, FOR SOLUTION ORAL 2 TIMES DAILY
Status: DISCONTINUED | OUTPATIENT
Start: 2022-05-17 | End: 2022-05-18 | Stop reason: HOSPADM

## 2022-05-13 RX ORDER — ROCURONIUM BROMIDE 10 MG/ML
INJECTION, SOLUTION INTRAVENOUS PRN
Status: DISCONTINUED | OUTPATIENT
Start: 2022-05-13 | End: 2022-05-13

## 2022-05-13 RX ORDER — FUROSEMIDE 10 MG/ML
20 INJECTION INTRAMUSCULAR; INTRAVENOUS
Status: DISCONTINUED | OUTPATIENT
Start: 2022-05-14 | End: 2022-05-18

## 2022-05-13 RX ORDER — TRANEXAMIC ACID 10 MG/ML
INJECTION, SOLUTION INTRAVENOUS PRN
Status: DISCONTINUED | OUTPATIENT
Start: 2022-05-13 | End: 2022-05-13

## 2022-05-13 RX ORDER — GABAPENTIN 300 MG/1
300 CAPSULE ORAL ONCE
Status: COMPLETED | OUTPATIENT
Start: 2022-05-13 | End: 2022-05-13

## 2022-05-13 RX ORDER — MIDAZOLAM HYDROCHLORIDE 1 MG/ML
INJECTION, SOLUTION INTRAMUSCULAR; INTRAVENOUS PRN
Status: DISCONTINUED | OUTPATIENT
Start: 2022-05-13 | End: 2022-05-13

## 2022-05-13 RX ORDER — SODIUM CHLORIDE 9 MG/ML
INJECTION, SOLUTION INTRAVENOUS CONTINUOUS PRN
Status: DISCONTINUED | OUTPATIENT
Start: 2022-05-13 | End: 2022-05-13

## 2022-05-13 RX ORDER — DEXTROSE MONOHYDRATE 25 G/50ML
12.5 INJECTION, SOLUTION INTRAVENOUS PRN
Status: DISCONTINUED | OUTPATIENT
Start: 2022-05-13 | End: 2022-05-18 | Stop reason: HOSPADM

## 2022-05-13 RX ORDER — AMOXICILLIN 250 MG
2 CAPSULE ORAL 2 TIMES DAILY PRN
Status: DISCONTINUED | OUTPATIENT
Start: 2022-05-13 | End: 2022-05-18 | Stop reason: HOSPADM

## 2022-05-13 RX ORDER — PHENYLEPHRINE HYDROCHLORIDE 10 MG/ML
INJECTION, SOLUTION INTRAMUSCULAR; INTRAVENOUS; SUBCUTANEOUS PRN
Status: DISCONTINUED | OUTPATIENT
Start: 2022-05-13 | End: 2022-05-13

## 2022-05-13 RX ORDER — NOREPINEPHRINE BITARTRATE 0.03 MG/ML
INJECTION, SOLUTION INTRAVENOUS CONTINUOUS PRN
Status: DISCONTINUED | OUTPATIENT
Start: 2022-05-13 | End: 2022-05-13

## 2022-05-13 RX ORDER — METOCLOPRAMIDE HYDROCHLORIDE 5 MG/ML
10 INJECTION INTRAMUSCULAR; INTRAVENOUS EVERY 6 HOURS PRN
Status: ACTIVE | OUTPATIENT
Start: 2022-05-13 | End: 2022-05-17

## 2022-05-13 RX ORDER — DEXMEDETOMIDINE HYDROCHLORIDE 4 UG/ML
0-.7 INJECTION, SOLUTION INTRAVENOUS CONTINUOUS
Status: DISCONTINUED | OUTPATIENT
Start: 2022-05-13 | End: 2022-05-13

## 2022-05-13 RX ORDER — BISACODYL 10 MG
10 SUPPOSITORY, RECTAL RECTAL DAILY PRN
Status: DISCONTINUED | OUTPATIENT
Start: 2022-05-13 | End: 2022-05-18 | Stop reason: HOSPADM

## 2022-05-13 RX ORDER — AMIODARONE HCL/D5W 450 MG/250
0.5 PLASTIC BAG, INJECTION (ML) INTRAVENOUS CONTINUOUS
Status: DISCONTINUED | OUTPATIENT
Start: 2022-05-13 | End: 2022-05-14

## 2022-05-13 RX ORDER — SODIUM CHLORIDE 9 MG/ML
INJECTION, SOLUTION INTRAVENOUS CONTINUOUS PRN
Status: DISCONTINUED | OUTPATIENT
Start: 2022-05-13 | End: 2022-05-18 | Stop reason: HOSPADM

## 2022-05-13 RX ORDER — AMIODARONE HYDROCHLORIDE 50 MG/ML
INJECTION, SOLUTION INTRAVENOUS PRN
Status: DISCONTINUED | OUTPATIENT
Start: 2022-05-13 | End: 2022-05-13

## 2022-05-13 RX ORDER — MANNITOL 250 MG/ML
INJECTION, SOLUTION INTRAVENOUS PRN
Status: DISCONTINUED | OUTPATIENT
Start: 2022-05-13 | End: 2022-05-13

## 2022-05-13 RX ORDER — CALCIUM CHLORIDE 100 MG/ML
INJECTION INTRAVENOUS; INTRAVENTRICULAR PRN
Status: DISCONTINUED | OUTPATIENT
Start: 2022-05-13 | End: 2022-05-13

## 2022-05-13 RX ORDER — DOBUTAMINE HYDROCHLORIDE 200 MG/100ML
0-20 INJECTION INTRAVENOUS CONTINUOUS
Status: DISCONTINUED | OUTPATIENT
Start: 2022-05-13 | End: 2022-05-14

## 2022-05-13 RX ORDER — 0.9 % SODIUM CHLORIDE 0.9 %
2 VIAL (ML) INJECTION EVERY 12 HOURS SCHEDULED
Status: DISCONTINUED | OUTPATIENT
Start: 2022-05-13 | End: 2022-05-18 | Stop reason: HOSPADM

## 2022-05-13 RX ORDER — FAMOTIDINE 20 MG/1
20 TABLET, FILM COATED ORAL ONCE
Status: COMPLETED | OUTPATIENT
Start: 2022-05-13 | End: 2022-05-13

## 2022-05-13 RX ORDER — DOBUTAMINE HYDROCHLORIDE 200 MG/100ML
INJECTION INTRAVENOUS CONTINUOUS PRN
Status: DISCONTINUED | OUTPATIENT
Start: 2022-05-13 | End: 2022-05-13

## 2022-05-13 RX ORDER — BISACODYL 10 MG
10 SUPPOSITORY, RECTAL RECTAL ONCE
Status: COMPLETED | OUTPATIENT
Start: 2022-05-15 | End: 2022-05-15

## 2022-05-13 RX ORDER — HYDROCODONE BITARTRATE AND ACETAMINOPHEN 5; 325 MG/1; MG/1
1 TABLET ORAL EVERY 4 HOURS PRN
Status: DISCONTINUED | OUTPATIENT
Start: 2022-05-13 | End: 2022-05-18 | Stop reason: HOSPADM

## 2022-05-13 RX ORDER — ONDANSETRON 2 MG/ML
4 INJECTION INTRAMUSCULAR; INTRAVENOUS EVERY 12 HOURS PRN
Status: DISPENSED | OUTPATIENT
Start: 2022-05-13 | End: 2022-05-17

## 2022-05-13 RX ORDER — PAPAVERINE HYDROCHLORIDE 30 MG/ML
INJECTION INTRAMUSCULAR; INTRAVENOUS PRN
Status: DISCONTINUED | OUTPATIENT
Start: 2022-05-13 | End: 2022-05-13

## 2022-05-13 RX ORDER — EPHEDRINE SULFATE/0.9% NACL/PF 50 MG/10ML
SYRINGE (ML) INTRAVENOUS PRN
Status: DISCONTINUED | OUTPATIENT
Start: 2022-05-13 | End: 2022-05-13

## 2022-05-13 RX ORDER — ROSUVASTATIN CALCIUM 20 MG/1
40 TABLET, COATED ORAL AT BEDTIME
Status: DISCONTINUED | OUTPATIENT
Start: 2022-05-13 | End: 2022-05-18 | Stop reason: HOSPADM

## 2022-05-13 RX ORDER — ALBUTEROL SULFATE 2.5 MG/3ML
2.5 SOLUTION RESPIRATORY (INHALATION)
Status: DISCONTINUED | OUTPATIENT
Start: 2022-05-13 | End: 2022-05-18 | Stop reason: HOSPADM

## 2022-05-13 RX ORDER — PROTAMINE SULFATE 10 MG/ML
INJECTION, SOLUTION INTRAVENOUS PRN
Status: DISCONTINUED | OUTPATIENT
Start: 2022-05-13 | End: 2022-05-13

## 2022-05-13 RX ORDER — DEXTROSE MONOHYDRATE 25 G/50ML
25 INJECTION, SOLUTION INTRAVENOUS PRN
Status: DISCONTINUED | OUTPATIENT
Start: 2022-05-13 | End: 2022-05-18 | Stop reason: HOSPADM

## 2022-05-13 RX ORDER — DEXMEDETOMIDINE HYDROCHLORIDE 4 UG/ML
INJECTION, SOLUTION INTRAVENOUS CONTINUOUS PRN
Status: DISCONTINUED | OUTPATIENT
Start: 2022-05-13 | End: 2022-05-13

## 2022-05-13 RX ORDER — DEXTROSE AND SODIUM CHLORIDE 5; .45 G/100ML; G/100ML
INJECTION, SOLUTION INTRAVENOUS CONTINUOUS
Status: DISCONTINUED | OUTPATIENT
Start: 2022-05-13 | End: 2022-05-14

## 2022-05-13 RX ORDER — POLYETHYLENE GLYCOL 3350 17 G/17G
17 POWDER, FOR SOLUTION ORAL DAILY PRN
Status: DISCONTINUED | OUTPATIENT
Start: 2022-05-13 | End: 2022-05-18 | Stop reason: HOSPADM

## 2022-05-13 RX ORDER — ONDANSETRON 4 MG/1
4 TABLET, ORALLY DISINTEGRATING ORAL EVERY 6 HOURS PRN
Status: ACTIVE | OUTPATIENT
Start: 2022-05-13 | End: 2022-05-17

## 2022-05-13 RX ORDER — ALBUMIN, HUMAN INJ 5% 5 %
12.5 SOLUTION INTRAVENOUS PRN
Status: COMPLETED | OUTPATIENT
Start: 2022-05-13 | End: 2022-05-13

## 2022-05-13 RX ORDER — VANCOMYCIN HYDROCHLORIDE 1 G/20ML
INJECTION, POWDER, LYOPHILIZED, FOR SOLUTION INTRAVENOUS PRN
Status: DISCONTINUED | OUTPATIENT
Start: 2022-05-13 | End: 2022-05-13 | Stop reason: HOSPADM

## 2022-05-13 RX ORDER — AMIODARONE HCL/D5W 450 MG/250
1 PLASTIC BAG, INJECTION (ML) INTRAVENOUS CONTINUOUS
Status: DISCONTINUED | OUTPATIENT
Start: 2022-05-13 | End: 2022-05-13

## 2022-05-13 RX ADMIN — Medication 100 MCG: at 07:16

## 2022-05-13 RX ADMIN — VANCOMYCIN HYDROCHLORIDE 1500 MG: 10 INJECTION, POWDER, LYOPHILIZED, FOR SOLUTION INTRAVENOUS at 07:14

## 2022-05-13 RX ADMIN — ALBUMIN, HUMAN INJ 5% 12.5 G: 5 SOLUTION at 20:51

## 2022-05-13 RX ADMIN — CALCIUM CHLORIDE 0.3 G: 100 INJECTION INTRAVENOUS; INTRAVENTRICULAR at 11:36

## 2022-05-13 RX ADMIN — ALBUMIN (HUMAN) 12.5 G: 12.5 INJECTION, SOLUTION INTRAVENOUS at 20:51

## 2022-05-13 RX ADMIN — HEPARIN SODIUM 30000 UNITS: 1000 INJECTION, SOLUTION INTRAVENOUS; SUBCUTANEOUS at 08:18

## 2022-05-13 RX ADMIN — SODIUM CHLORIDE, SODIUM GLUCONATE, SODIUM ACETATE, POTASSIUM CHLORIDE AND MAGNESIUM CHLORIDE: 526; 502; 368; 37; 30 INJECTION, SOLUTION INTRAVENOUS at 06:58

## 2022-05-13 RX ADMIN — ROCURONIUM BROMIDE 50 MG: 10 INJECTION INTRAVENOUS at 09:37

## 2022-05-13 RX ADMIN — SODIUM BICARBONATE 50 MEQ: 84 INJECTION, SOLUTION INTRAVENOUS at 08:46

## 2022-05-13 RX ADMIN — FENTANYL CITRATE 250 MCG: 50 INJECTION, SOLUTION INTRAMUSCULAR; INTRAVENOUS at 08:45

## 2022-05-13 RX ADMIN — FENTANYL CITRATE 250 MCG: 50 INJECTION, SOLUTION INTRAMUSCULAR; INTRAVENOUS at 07:06

## 2022-05-13 RX ADMIN — EPHEDRINE SULFATE 10 MG: 5 INJECTION INTRAVENOUS at 07:45

## 2022-05-13 RX ADMIN — ROSUVASTATIN CALCIUM 40 MG: 20 TABLET, FILM COATED ORAL at 21:08

## 2022-05-13 RX ADMIN — CHLORHEXIDINE GLUCONATE 15 ML: 1.2 RINSE ORAL at 12:50

## 2022-05-13 RX ADMIN — PHENYLEPHRINE HYDROCHLORIDE 20 MCG/MIN: 10 INJECTION, SOLUTION INTRAMUSCULAR; INTRAVENOUS; SUBCUTANEOUS at 07:16

## 2022-05-13 RX ADMIN — PROTAMINE SULFATE 50 MG: 10 INJECTION, SOLUTION INTRAVENOUS at 10:50

## 2022-05-13 RX ADMIN — HYDROCODONE BITARTRATE AND ACETAMINOPHEN 2 TABLET: 5; 325 TABLET ORAL at 21:07

## 2022-05-13 RX ADMIN — ALBUMIN HUMAN: 0.05 INJECTION, SOLUTION INTRAVENOUS at 10:25

## 2022-05-13 RX ADMIN — CALCIUM CHLORIDE 0.4 G: 100 INJECTION INTRAVENOUS; INTRAVENTRICULAR at 11:27

## 2022-05-13 RX ADMIN — Medication 0.2 MG/KG/HR: at 07:40

## 2022-05-13 RX ADMIN — ROCURONIUM BROMIDE 50 MG: 10 INJECTION INTRAVENOUS at 08:50

## 2022-05-13 RX ADMIN — ROCURONIUM BROMIDE 100 MG: 10 INJECTION INTRAVENOUS at 07:08

## 2022-05-13 RX ADMIN — MIDAZOLAM HYDROCHLORIDE 3 MG: 1 INJECTION, SOLUTION INTRAMUSCULAR; INTRAVENOUS at 09:37

## 2022-05-13 RX ADMIN — ASPIRIN 300 MG: 300 SUPPOSITORY RECTAL at 21:56

## 2022-05-13 RX ADMIN — SODIUM CHLORIDE, PRESERVATIVE FREE 2 ML: 5 INJECTION INTRAVENOUS at 20:49

## 2022-05-13 RX ADMIN — FAMOTIDINE 20 MG: 20 TABLET ORAL at 05:41

## 2022-05-13 RX ADMIN — Medication 0.5 MG/MIN: at 19:05

## 2022-05-13 RX ADMIN — POTASSIUM CHLORIDE, DEXTROSE MONOHYDRATE AND SODIUM CHLORIDE: 300; 5; 450 INJECTION, SOLUTION INTRAVENOUS at 11:40

## 2022-05-13 RX ADMIN — LIDOCAINE HYDROCHLORIDE 100 MG: 20 INJECTION, SOLUTION INTRAVENOUS at 10:02

## 2022-05-13 RX ADMIN — SODIUM CHLORIDE, SODIUM GLUCONATE, SODIUM ACETATE, POTASSIUM CHLORIDE AND MAGNESIUM CHLORIDE: 526; 502; 368; 37; 30 INJECTION, SOLUTION INTRAVENOUS at 06:57

## 2022-05-13 RX ADMIN — AMIODARONE HYDROCHLORIDE 150 MG: 50 INJECTION, SOLUTION INTRAVENOUS at 10:14

## 2022-05-13 RX ADMIN — ALBUMIN (HUMAN) 12.5 G: 12.5 INJECTION, SOLUTION INTRAVENOUS at 12:53

## 2022-05-13 RX ADMIN — EPHEDRINE SULFATE 10 MG: 5 INJECTION INTRAVENOUS at 11:35

## 2022-05-13 RX ADMIN — EPHEDRINE SULFATE 20 MG: 5 INJECTION INTRAVENOUS at 07:18

## 2022-05-13 RX ADMIN — MIDAZOLAM HYDROCHLORIDE 2 MG: 1 INJECTION, SOLUTION INTRAMUSCULAR; INTRAVENOUS at 08:50

## 2022-05-13 RX ADMIN — EPHEDRINE SULFATE 10 MG: 5 INJECTION INTRAVENOUS at 08:21

## 2022-05-13 RX ADMIN — Medication 4 MCG/MIN: at 07:42

## 2022-05-13 RX ADMIN — FAMOTIDINE 20 MG: 10 INJECTION INTRAVENOUS at 12:50

## 2022-05-13 RX ADMIN — HEPARIN SODIUM 10000 UNITS: 1000 INJECTION, SOLUTION INTRAVENOUS; SUBCUTANEOUS at 08:46

## 2022-05-13 RX ADMIN — MIDAZOLAM HYDROCHLORIDE 1 MG: 1 INJECTION, SOLUTION INTRAMUSCULAR; INTRAVENOUS at 06:16

## 2022-05-13 RX ADMIN — MANNITOL 12.5 G: 12.5 INJECTION, SOLUTION INTRAVENOUS at 08:46

## 2022-05-13 RX ADMIN — SODIUM CHLORIDE: 9 INJECTION, SOLUTION INTRAVENOUS at 11:38

## 2022-05-13 RX ADMIN — SODIUM CHLORIDE: 9 INJECTION, SOLUTION INTRAVENOUS at 06:58

## 2022-05-13 RX ADMIN — NICARDIPINE HYDROCHLORIDE 0.25 MG: 25 INJECTION, SOLUTION INTRAVENOUS at 07:51

## 2022-05-13 RX ADMIN — ALBUMIN (HUMAN) 12.5 G: 12.5 INJECTION, SOLUTION INTRAVENOUS at 12:15

## 2022-05-13 RX ADMIN — CALCIUM CHLORIDE 0.3 G: 100 INJECTION INTRAVENOUS; INTRAVENTRICULAR at 10:45

## 2022-05-13 RX ADMIN — MAGNESIUM SULFATE HEPTAHYDRATE 2 G: 500 INJECTION, SOLUTION INTRAMUSCULAR; INTRAVENOUS at 10:02

## 2022-05-13 RX ADMIN — DOBUTAMINE IN DEXTROSE 2 MCG/KG/MIN: 200 INJECTION, SOLUTION INTRAVENOUS at 10:10

## 2022-05-13 RX ADMIN — PROTAMINE SULFATE 150 MG: 10 INJECTION, SOLUTION INTRAVENOUS at 10:20

## 2022-05-13 RX ADMIN — SODIUM CHLORIDE, PRESERVATIVE FREE 2 ML: 5 INJECTION INTRAVENOUS at 12:50

## 2022-05-13 RX ADMIN — CEFAZOLIN SODIUM 2000 MG: 300 INJECTION, POWDER, LYOPHILIZED, FOR SOLUTION INTRAVENOUS at 20:49

## 2022-05-13 RX ADMIN — SODIUM CHLORIDE 2 MG/KG/HR: 0.9 INJECTION, SOLUTION INTRAVENOUS at 07:35

## 2022-05-13 RX ADMIN — Medication 50 MG: at 07:35

## 2022-05-13 RX ADMIN — PROPOFOL 50 MG: 10 INJECTION, EMULSION INTRAVENOUS at 08:45

## 2022-05-13 RX ADMIN — GABAPENTIN 300 MG: 300 CAPSULE ORAL at 05:41

## 2022-05-13 RX ADMIN — DEXMEDETOMIDINE 0.4 MCG/KG/HR: 100 INJECTION, SOLUTION, CONCENTRATE INTRAVENOUS at 10:50

## 2022-05-13 RX ADMIN — SODIUM CHLORIDE: 9 INJECTION, SOLUTION INTRAVENOUS at 06:57

## 2022-05-13 RX ADMIN — TRANEXAMIC ACID 1000 MG: 10 INJECTION, SOLUTION INTRAVENOUS at 07:35

## 2022-05-13 RX ADMIN — INSULIN HUMAN 2 UNITS/HR: 100 INJECTION, SOLUTION PARENTERAL at 09:02

## 2022-05-13 RX ADMIN — ONDANSETRON 4 MG: 2 INJECTION INTRAMUSCULAR; INTRAVENOUS at 17:53

## 2022-05-13 RX ADMIN — CEFAZOLIN SODIUM 2000 MG: 300 INJECTION, POWDER, LYOPHILIZED, FOR SOLUTION INTRAVENOUS at 07:35

## 2022-05-13 RX ADMIN — MUPIROCIN 1 APPLICATION.: 20 OINTMENT TOPICAL at 20:44

## 2022-05-13 RX ADMIN — MIDAZOLAM HYDROCHLORIDE 9 MG: 1 INJECTION, SOLUTION INTRAMUSCULAR; INTRAVENOUS at 07:06

## 2022-05-13 RX ADMIN — PROPOFOL 50 MG: 10 INJECTION, EMULSION INTRAVENOUS at 07:31

## 2022-05-13 SDOH — SOCIAL STABILITY: SOCIAL INSECURITY: RISK FACTORS: HEART DISEASE

## 2022-05-13 ASSESSMENT — PATIENT HEALTH QUESTIONNAIRE - PHQ9: IS PATIENT ABLE TO COMPLETE PHQ2 OR PHQ9: NO, DEFER TO LATER TIME

## 2022-05-13 ASSESSMENT — PAIN SCALES - GENERAL
PAINLEVEL_OUTOF10: 3
PAINLEVEL_OUTOF10: 8
PAINLEVEL_OUTOF10: 0

## 2022-05-13 ASSESSMENT — PAIN SCALES - BEHAVIORAL PAIN SCALE (BPS)
BPS_SCORE: 3
BPS_SCORE: 3

## 2022-05-14 LAB
ANION GAP SERPL CALC-SCNC: 7 MMOL/L (ref 10–20)
BASE EXCESS / DEFICIT, VENOUS - RESPIRATORY: 0 MMOL/L (ref -2–2)
BDY SITE: ABNORMAL
BODY TEMPERATURE: 36.4 DEGREES
BUN SERPL-MCNC: 16 MG/DL (ref 6–20)
BUN/CREAT SERPL: 15 (ref 7–25)
CALCIUM SERPL-MCNC: 7.8 MG/DL (ref 8.4–10.2)
CHLORIDE SERPL-SCNC: 114 MMOL/L (ref 98–107)
CO2 SERPL-SCNC: 24 MMOL/L (ref 21–32)
COHGB MFR BLDV: 2.1 %
CONDITION: ABNORMAL
CREAT SERPL-MCNC: 1.08 MG/DL (ref 0.67–1.17)
DEPRECATED RDW RBC: 48.7 FL (ref 39–50)
ERYTHROCYTE [DISTWIDTH] IN BLOOD: 13.1 % (ref 11–15)
FASTING DURATION TIME PATIENT: ABNORMAL H
FIO2 ON VENT: 21 %
GFR SERPLBLD BASED ON 1.73 SQ M-ARVRAT: 69 ML/MIN
GLUCOSE BLDC GLUCOMTR-MCNC: 101 MG/DL (ref 70–99)
GLUCOSE BLDC GLUCOMTR-MCNC: 102 MG/DL (ref 70–99)
GLUCOSE BLDC GLUCOMTR-MCNC: 104 MG/DL (ref 70–99)
GLUCOSE BLDC GLUCOMTR-MCNC: 118 MG/DL (ref 70–99)
GLUCOSE BLDC GLUCOMTR-MCNC: 118 MG/DL (ref 70–99)
GLUCOSE BLDC GLUCOMTR-MCNC: 120 MG/DL (ref 70–99)
GLUCOSE BLDC GLUCOMTR-MCNC: 120 MG/DL (ref 70–99)
GLUCOSE BLDC GLUCOMTR-MCNC: 123 MG/DL (ref 70–99)
GLUCOSE BLDC GLUCOMTR-MCNC: 124 MG/DL (ref 70–99)
GLUCOSE BLDC GLUCOMTR-MCNC: 126 MG/DL (ref 70–99)
GLUCOSE BLDC GLUCOMTR-MCNC: 130 MG/DL (ref 70–99)
GLUCOSE BLDC GLUCOMTR-MCNC: 136 MG/DL (ref 70–99)
GLUCOSE BLDC GLUCOMTR-MCNC: 143 MG/DL (ref 70–99)
GLUCOSE BLDC GLUCOMTR-MCNC: 98 MG/DL (ref 70–99)
GLUCOSE SERPL-MCNC: 127 MG/DL (ref 70–99)
HCO3 BLDV-SCNC: 25.8 MMOL/L (ref 22–28)
HCT VFR BLD CALC: 27 % (ref 39–51)
HCT VFR BLD CALC: 29.4 % (ref 39–51)
HGB BLD-MCNC: 8.9 G/DL (ref 13–17)
HGB BLD-MCNC: 9.5 G/DL (ref 13–17)
INR PPP: 1.2
MAGNESIUM SERPL-MCNC: 2.2 MG/DL (ref 1.7–2.4)
MCH RBC QN AUTO: 32.5 PG (ref 26–34)
MCHC RBC AUTO-ENTMCNC: 32.3 G/DL (ref 32–36.5)
MCV RBC AUTO: 100.7 FL (ref 78–100)
METHGB MFR BLDMV: 0.3 %
NRBC BLD MANUAL-RTO: 0 /100 WBC
OXYHGB MFR BLDV: 60.6 % (ref 60–80)
PCO2 BLDV: 48 MM HG (ref 41–54)
PH BLDV: 7.34 UNITS (ref 7.35–7.45)
PLATELET # BLD AUTO: 127 K/MCL (ref 140–450)
PO2 BLDV: 33 MM HG (ref 35–42)
POTASSIUM SERPL-SCNC: 4.4 MMOL/L (ref 3.4–5.1)
PROTHROMBIN TIME: 12.4 SEC (ref 9.7–11.8)
RBC # BLD: 2.92 MIL/MCL (ref 4.5–5.9)
SAO2 DF BLDV: 62 % (ref 60–80)
SODIUM SERPL-SCNC: 141 MMOL/L (ref 135–145)
WBC # BLD: 15.6 K/MCL (ref 4.2–11)

## 2022-05-14 PROCEDURE — 10002801 HB RX 250 W/O HCPCS: Performed by: PHYSICIAN ASSISTANT

## 2022-05-14 PROCEDURE — 13003289 HB OXYGEN THERAPY DAILY

## 2022-05-14 PROCEDURE — 10002800 HB RX 250 W HCPCS: Performed by: INTERNAL MEDICINE

## 2022-05-14 PROCEDURE — 10002803 HB RX 637: Performed by: PHYSICIAN ASSISTANT

## 2022-05-14 PROCEDURE — 83050 HGB METHEMOGLOBIN QUAN: CPT

## 2022-05-14 PROCEDURE — 85027 COMPLETE CBC AUTOMATED: CPT | Performed by: PHYSICIAN ASSISTANT

## 2022-05-14 PROCEDURE — P9045 ALBUMIN (HUMAN), 5%, 250 ML: HCPCS

## 2022-05-14 PROCEDURE — 10002807 HB RX 258: Performed by: PHYSICIAN ASSISTANT

## 2022-05-14 PROCEDURE — 10002800 HB RX 250 W HCPCS: Performed by: PHYSICIAN ASSISTANT

## 2022-05-14 PROCEDURE — 10002800 HB RX 250 W HCPCS

## 2022-05-14 PROCEDURE — P9045 ALBUMIN (HUMAN), 5%, 250 ML: HCPCS | Performed by: INTERNAL MEDICINE

## 2022-05-14 PROCEDURE — 80048 BASIC METABOLIC PNL TOTAL CA: CPT | Performed by: PHYSICIAN ASSISTANT

## 2022-05-14 PROCEDURE — 83735 ASSAY OF MAGNESIUM: CPT | Performed by: PHYSICIAN ASSISTANT

## 2022-05-14 PROCEDURE — 85610 PROTHROMBIN TIME: CPT | Performed by: PHYSICIAN ASSISTANT

## 2022-05-14 PROCEDURE — 10004651 HB RX, NO CHARGE ITEM: Performed by: PHYSICIAN ASSISTANT

## 2022-05-14 PROCEDURE — 10000008 HB ROOM CHARGE ICU OR CCU

## 2022-05-14 RX ORDER — FAMOTIDINE 20 MG/1
20 TABLET, FILM COATED ORAL DAILY
Status: DISCONTINUED | OUTPATIENT
Start: 2022-05-15 | End: 2022-05-15

## 2022-05-14 RX ORDER — MIDODRINE HYDROCHLORIDE 5 MG/1
10 TABLET ORAL EVERY 8 HOURS SCHEDULED
Status: DISCONTINUED | OUTPATIENT
Start: 2022-05-14 | End: 2022-05-17

## 2022-05-14 RX ORDER — ALBUMIN, HUMAN INJ 5% 5 %
12.5 SOLUTION INTRAVENOUS ONCE
Status: COMPLETED | OUTPATIENT
Start: 2022-05-14 | End: 2022-05-14

## 2022-05-14 RX ORDER — MIDODRINE HYDROCHLORIDE 5 MG/1
5 TABLET ORAL EVERY 8 HOURS SCHEDULED
Status: DISCONTINUED | OUTPATIENT
Start: 2022-05-14 | End: 2022-05-14

## 2022-05-14 RX ORDER — AMIODARONE HYDROCHLORIDE 200 MG/1
200 TABLET ORAL EVERY 12 HOURS SCHEDULED
Status: DISCONTINUED | OUTPATIENT
Start: 2022-05-14 | End: 2022-05-17

## 2022-05-14 RX ORDER — CALCIUM GLUCONATE 20 MG/ML
2 INJECTION, SOLUTION INTRAVENOUS ONCE
Status: COMPLETED | OUTPATIENT
Start: 2022-05-14 | End: 2022-05-14

## 2022-05-14 RX ORDER — ALBUMIN, HUMAN INJ 5% 5 %
SOLUTION INTRAVENOUS
Status: COMPLETED
Start: 2022-05-14 | End: 2022-05-14

## 2022-05-14 RX ADMIN — HYDROCODONE BITARTRATE AND ACETAMINOPHEN 1 TABLET: 5; 325 TABLET ORAL at 20:49

## 2022-05-14 RX ADMIN — MUPIROCIN 1 APPLICATION.: 20 OINTMENT TOPICAL at 20:53

## 2022-05-14 RX ADMIN — CEFAZOLIN SODIUM 2000 MG: 300 INJECTION, POWDER, LYOPHILIZED, FOR SOLUTION INTRAVENOUS at 05:04

## 2022-05-14 RX ADMIN — ALBUMIN (HUMAN) 12.5 G: 12.5 INJECTION, SOLUTION INTRAVENOUS at 05:04

## 2022-05-14 RX ADMIN — CALCIUM GLUCONATE 2 G: 20 INJECTION, SOLUTION INTRAVENOUS at 10:47

## 2022-05-14 RX ADMIN — HYDROCODONE BITARTRATE AND ACETAMINOPHEN 1 TABLET: 5; 325 TABLET ORAL at 10:16

## 2022-05-14 RX ADMIN — ROSUVASTATIN CALCIUM 40 MG: 20 TABLET, FILM COATED ORAL at 20:49

## 2022-05-14 RX ADMIN — MIDODRINE HYDROCHLORIDE 5 MG: 5 TABLET ORAL at 10:29

## 2022-05-14 RX ADMIN — Medication 1000 MCG: at 08:21

## 2022-05-14 RX ADMIN — SODIUM CHLORIDE, PRESERVATIVE FREE 2 ML: 5 INJECTION INTRAVENOUS at 09:00

## 2022-05-14 RX ADMIN — MUPIROCIN 1 APPLICATION.: 20 OINTMENT TOPICAL at 08:28

## 2022-05-14 RX ADMIN — CEFAZOLIN SODIUM 2000 MG: 300 INJECTION, POWDER, LYOPHILIZED, FOR SOLUTION INTRAVENOUS at 14:16

## 2022-05-14 RX ADMIN — MORPHINE SULFATE 2 MG: 2 INJECTION, SOLUTION INTRAMUSCULAR; INTRAVENOUS at 07:02

## 2022-05-14 RX ADMIN — GABAPENTIN 300 MG: 300 CAPSULE ORAL at 08:21

## 2022-05-14 RX ADMIN — ALBUMIN, HUMAN INJ 5% 12.5 G: 5 SOLUTION at 20:48

## 2022-05-14 RX ADMIN — FUROSEMIDE 20 MG: 10 INJECTION, SOLUTION INTRAMUSCULAR; INTRAVENOUS at 10:29

## 2022-05-14 RX ADMIN — DOCUSATE SODIUM AND SENNOSIDES 2 TABLET: 8.6; 5 TABLET, FILM COATED ORAL at 08:21

## 2022-05-14 RX ADMIN — HYDROCODONE BITARTRATE AND ACETAMINOPHEN 1 TABLET: 5; 325 TABLET ORAL at 08:20

## 2022-05-14 RX ADMIN — AMIODARONE HYDROCHLORIDE 200 MG: 200 TABLET ORAL at 10:29

## 2022-05-14 RX ADMIN — FUROSEMIDE 20 MG: 10 INJECTION, SOLUTION INTRAMUSCULAR; INTRAVENOUS at 17:34

## 2022-05-14 RX ADMIN — ALBUMIN (HUMAN) 12.5 G: 12.5 INJECTION, SOLUTION INTRAVENOUS at 02:29

## 2022-05-14 RX ADMIN — KETOROLAC TROMETHAMINE 15 MG: 15 INJECTION, SOLUTION INTRAMUSCULAR; INTRAVENOUS at 10:30

## 2022-05-14 RX ADMIN — ALBUMIN (HUMAN) 12.5 G: 12.5 INJECTION, SOLUTION INTRAVENOUS at 20:48

## 2022-05-14 RX ADMIN — ASPIRIN 325 MG: 325 TABLET, COATED ORAL at 08:21

## 2022-05-14 RX ADMIN — MIDODRINE HYDROCHLORIDE 10 MG: 5 TABLET ORAL at 22:13

## 2022-05-14 RX ADMIN — SODIUM CHLORIDE, PRESERVATIVE FREE 2 ML: 5 INJECTION INTRAVENOUS at 20:55

## 2022-05-14 RX ADMIN — CEFAZOLIN SODIUM 2000 MG: 300 INJECTION, POWDER, LYOPHILIZED, FOR SOLUTION INTRAVENOUS at 22:34

## 2022-05-14 RX ADMIN — POTASSIUM CHLORIDE, DEXTROSE MONOHYDRATE AND SODIUM CHLORIDE: 300; 5; 450 INJECTION, SOLUTION INTRAVENOUS at 02:30

## 2022-05-14 RX ADMIN — FAMOTIDINE 20 MG: 10 INJECTION INTRAVENOUS at 08:20

## 2022-05-14 RX ADMIN — MORPHINE SULFATE 2 MG: 2 INJECTION, SOLUTION INTRAMUSCULAR; INTRAVENOUS at 03:16

## 2022-05-14 ASSESSMENT — PAIN SCALES - GENERAL
PAINLEVEL_OUTOF10: 6
PAINLEVEL_OUTOF10: 7
PAINLEVEL_OUTOF10: 8
PAINLEVEL_OUTOF10: 3
PAINLEVEL_OUTOF10: 8
PAINLEVEL_OUTOF10: 3
PAINLEVEL_OUTOF10: 7
PAINLEVEL_OUTOF10: 5
PAINLEVEL_OUTOF10: 5
PAINLEVEL_OUTOF10: 4
PAINLEVEL_OUTOF10: 0
PAINLEVEL_OUTOF10: 6
PAINLEVEL_OUTOF10: 6
PAINLEVEL_OUTOF10: 5

## 2022-05-15 LAB
ANION GAP SERPL CALC-SCNC: 9 MMOL/L (ref 10–20)
BUN SERPL-MCNC: 22 MG/DL (ref 6–20)
BUN/CREAT SERPL: 13 (ref 7–25)
CALCIUM SERPL-MCNC: 7.8 MG/DL (ref 8.4–10.2)
CHLORIDE SERPL-SCNC: 108 MMOL/L (ref 98–107)
CO2 SERPL-SCNC: 26 MMOL/L (ref 21–32)
CREAT SERPL-MCNC: 1.66 MG/DL (ref 0.67–1.17)
DEPRECATED RDW RBC: 52 FL (ref 39–50)
ERYTHROCYTE [DISTWIDTH] IN BLOOD: 13.6 % (ref 11–15)
FASTING DURATION TIME PATIENT: ABNORMAL H
GFR SERPLBLD BASED ON 1.73 SQ M-ARVRAT: 41 ML/MIN
GLUCOSE BLDC GLUCOMTR-MCNC: 107 MG/DL (ref 70–99)
GLUCOSE SERPL-MCNC: 145 MG/DL (ref 70–99)
HCT VFR BLD CALC: 26.3 % (ref 39–51)
HGB BLD-MCNC: 8.2 G/DL (ref 13–17)
MAGNESIUM SERPL-MCNC: 2.3 MG/DL (ref 1.7–2.4)
MCH RBC QN AUTO: 32.5 PG (ref 26–34)
MCHC RBC AUTO-ENTMCNC: 31.2 G/DL (ref 32–36.5)
MCV RBC AUTO: 104.4 FL (ref 78–100)
NRBC BLD MANUAL-RTO: 0 /100 WBC
PLATELET # BLD AUTO: 87 K/MCL (ref 140–450)
POTASSIUM SERPL-SCNC: 4.4 MMOL/L (ref 3.4–5.1)
RBC # BLD: 2.52 MIL/MCL (ref 4.5–5.9)
SODIUM SERPL-SCNC: 139 MMOL/L (ref 135–145)
WBC # BLD: 11.9 K/MCL (ref 4.2–11)

## 2022-05-15 PROCEDURE — 10003585 HB ROOM CHARGE INTERMEDIATE CARE

## 2022-05-15 PROCEDURE — 97530 THERAPEUTIC ACTIVITIES: CPT

## 2022-05-15 PROCEDURE — 85027 COMPLETE CBC AUTOMATED: CPT | Performed by: PHYSICIAN ASSISTANT

## 2022-05-15 PROCEDURE — 97162 PT EVAL MOD COMPLEX 30 MIN: CPT

## 2022-05-15 PROCEDURE — 10002803 HB RX 637: Performed by: THORACIC SURGERY (CARDIOTHORACIC VASCULAR SURGERY)

## 2022-05-15 PROCEDURE — 80048 BASIC METABOLIC PNL TOTAL CA: CPT | Performed by: PHYSICIAN ASSISTANT

## 2022-05-15 PROCEDURE — 10002803 HB RX 637: Performed by: INTERNAL MEDICINE

## 2022-05-15 PROCEDURE — 86022 PLATELET ANTIBODIES: CPT | Performed by: PHYSICIAN ASSISTANT

## 2022-05-15 PROCEDURE — 10002803 HB RX 637: Performed by: PHYSICIAN ASSISTANT

## 2022-05-15 PROCEDURE — P9045 ALBUMIN (HUMAN), 5%, 250 ML: HCPCS | Performed by: PHYSICIAN ASSISTANT

## 2022-05-15 PROCEDURE — 97165 OT EVAL LOW COMPLEX 30 MIN: CPT

## 2022-05-15 PROCEDURE — 10002800 HB RX 250 W HCPCS: Performed by: PHYSICIAN ASSISTANT

## 2022-05-15 PROCEDURE — 10004651 HB RX, NO CHARGE ITEM: Performed by: PHYSICIAN ASSISTANT

## 2022-05-15 PROCEDURE — 83735 ASSAY OF MAGNESIUM: CPT | Performed by: PHYSICIAN ASSISTANT

## 2022-05-15 PROCEDURE — 97535 SELF CARE MNGMENT TRAINING: CPT

## 2022-05-15 RX ORDER — LANOLIN ALCOHOL/MO/W.PET/CERES
6 CREAM (GRAM) TOPICAL NIGHTLY
Status: DISCONTINUED | OUTPATIENT
Start: 2022-05-15 | End: 2022-05-18 | Stop reason: HOSPADM

## 2022-05-15 RX ORDER — PANTOPRAZOLE SODIUM 40 MG/1
40 TABLET, DELAYED RELEASE ORAL
Status: DISCONTINUED | OUTPATIENT
Start: 2022-05-16 | End: 2022-05-18 | Stop reason: HOSPADM

## 2022-05-15 RX ORDER — ALBUMIN, HUMAN INJ 5% 5 %
12.5 SOLUTION INTRAVENOUS ONCE
Status: COMPLETED | OUTPATIENT
Start: 2022-05-15 | End: 2022-05-15

## 2022-05-15 RX ADMIN — MUPIROCIN 1 APPLICATION.: 20 OINTMENT TOPICAL at 21:42

## 2022-05-15 RX ADMIN — MUPIROCIN 1 APPLICATION.: 20 OINTMENT TOPICAL at 08:47

## 2022-05-15 RX ADMIN — DOCUSATE SODIUM AND SENNOSIDES 2 TABLET: 8.6; 5 TABLET, FILM COATED ORAL at 08:47

## 2022-05-15 RX ADMIN — Medication 6 MG: at 21:40

## 2022-05-15 RX ADMIN — FAMOTIDINE 20 MG: 20 TABLET ORAL at 08:47

## 2022-05-15 RX ADMIN — CEFAZOLIN SODIUM 2000 MG: 300 INJECTION, POWDER, LYOPHILIZED, FOR SOLUTION INTRAVENOUS at 05:03

## 2022-05-15 RX ADMIN — MIDODRINE HYDROCHLORIDE 10 MG: 5 TABLET ORAL at 05:32

## 2022-05-15 RX ADMIN — HYDROCODONE BITARTRATE AND ACETAMINOPHEN 1 TABLET: 5; 325 TABLET ORAL at 14:10

## 2022-05-15 RX ADMIN — SODIUM CHLORIDE, PRESERVATIVE FREE 2 ML: 5 INJECTION INTRAVENOUS at 08:48

## 2022-05-15 RX ADMIN — MIDODRINE HYDROCHLORIDE 10 MG: 5 TABLET ORAL at 15:37

## 2022-05-15 RX ADMIN — SODIUM CHLORIDE, PRESERVATIVE FREE 2 ML: 5 INJECTION INTRAVENOUS at 21:42

## 2022-05-15 RX ADMIN — MIDODRINE HYDROCHLORIDE 10 MG: 5 TABLET ORAL at 21:41

## 2022-05-15 RX ADMIN — GABAPENTIN 300 MG: 300 CAPSULE ORAL at 08:47

## 2022-05-15 RX ADMIN — BISACODYL 10 MG: 10 SUPPOSITORY RECTAL at 08:47

## 2022-05-15 RX ADMIN — ROSUVASTATIN CALCIUM 40 MG: 20 TABLET, FILM COATED ORAL at 21:41

## 2022-05-15 RX ADMIN — ALBUMIN (HUMAN) 12.5 G: 12.5 INJECTION, SOLUTION INTRAVENOUS at 10:11

## 2022-05-15 RX ADMIN — Medication 1000 MCG: at 08:47

## 2022-05-15 ASSESSMENT — PAIN SCALES - GENERAL
PAINLEVEL_OUTOF10: 6
PAINLEVEL_OUTOF10: 3
PAINLEVEL_OUTOF10: 4
PAINLEVEL_OUTOF10: 3
PAINLEVEL_OUTOF10: 0

## 2022-05-15 ASSESSMENT — COGNITIVE AND FUNCTIONAL STATUS - GENERAL
DO YOU HAVE DIFFICULTY DRESSING OR BATHING: NO
BECAUSE OF A PHYSICAL, MENTAL, OR EMOTIONAL CONDITION, DO YOU HAVE DIFFICULTY DOING ERRANDS ALONE: NO
HELP NEEDED FOR BATHING: A LOT
HELP NEEDED DRESSING REGULAR UPPER BODY CLOTHING: A LITTLE
DAILY_ACTIVITY_RAW_SCORE: 18
ARE YOU DEAF OR DO YOU HAVE SERIOUS DIFFICULTY  HEARING: NO
HELP NEEDED FOR TOILETING: A LITTLE
BASIC_MOBILITY_RAW_SCORE: 16
ARE YOU BLIND OR DO YOU HAVE SERIOUS DIFFICULTY SEEING, EVEN WHEN WEARING GLASSES: NO
APPLIED_COGNITIVE_RAW_SCORE: 24
HELP NEEDED DRESSING REGULAR LOWER BODY CLOTHING: A LOT
BASIC_MOBILITY_CONVERTED_SCORE: 38.32
APPLIED_COGNITIVE_CONVERTED_SCORE: 62.21
DO YOU HAVE SERIOUS DIFFICULTY WALKING OR CLIMBING STAIRS: NO
BECAUSE OF A PHYSICAL, MENTAL, OR EMOTIONAL CONDITION, DO YOU HAVE SERIOUS DIFFICULTY CONCENTRATING, REMEMBERING OR MAKING DECISIONS: NO
DAILY_ACTIVITY_CONVERTED_SCORE: 38.66

## 2022-05-15 ASSESSMENT — ACTIVITIES OF DAILY LIVING (ADL)
RECENT_DECLINE_ADL: NO
CHRONIC_PAIN_PRESENT: NO
GROOMING: INDEPENDENT
HOME_MANAGEMENT_TIME_ENTRY: 10
PRIOR_ADL_BATHING: INDEPENDENT
ADL_SCORE: 12
ADL_BEFORE_ADMISSION: INDEPENDENT
EATING: INDEPENDENT
PRIOR_ADL: INDEPENDENT
PRIOR_ADL_TOILETING: INDEPENDENT

## 2022-05-15 ASSESSMENT — ENCOUNTER SYMPTOMS: PAIN SEVERITY NOW: 0

## 2022-05-16 LAB
ANION GAP SERPL CALC-SCNC: 8 MMOL/L (ref 10–20)
BUN SERPL-MCNC: 19 MG/DL (ref 6–20)
BUN/CREAT SERPL: 14 (ref 7–25)
CALCIUM SERPL-MCNC: 8.3 MG/DL (ref 8.4–10.2)
CHLORIDE SERPL-SCNC: 110 MMOL/L (ref 98–107)
CO2 SERPL-SCNC: 24 MMOL/L (ref 21–32)
CREAT SERPL-MCNC: 1.33 MG/DL (ref 0.67–1.17)
DEPRECATED RDW RBC: 50.6 FL (ref 39–50)
ERYTHROCYTE [DISTWIDTH] IN BLOOD: 13.3 % (ref 11–15)
FASTING DURATION TIME PATIENT: ABNORMAL H
GFR SERPLBLD BASED ON 1.73 SQ M-ARVRAT: 54 ML/MIN
GLUCOSE SERPL-MCNC: 108 MG/DL (ref 70–99)
HCT VFR BLD CALC: 27.2 % (ref 39–51)
HGB BLD-MCNC: 8.7 G/DL (ref 13–17)
MAGNESIUM SERPL-MCNC: 2.2 MG/DL (ref 1.7–2.4)
MCH RBC QN AUTO: 32.7 PG (ref 26–34)
MCHC RBC AUTO-ENTMCNC: 32 G/DL (ref 32–36.5)
MCV RBC AUTO: 102.3 FL (ref 78–100)
NRBC BLD MANUAL-RTO: 0 /100 WBC
PF4 HEPARIN CMPLX IGG SERPL IA: 0.09
PF4 HEPARIN CMPLX IGG SERPL QL IA: NEGATIVE
PLATELET # BLD AUTO: 97 K/MCL (ref 140–450)
POTASSIUM SERPL-SCNC: 3.9 MMOL/L (ref 3.4–5.1)
RBC # BLD: 2.66 MIL/MCL (ref 4.5–5.9)
SODIUM SERPL-SCNC: 138 MMOL/L (ref 135–145)
WBC # BLD: 9.9 K/MCL (ref 4.2–11)

## 2022-05-16 PROCEDURE — 10002803 HB RX 637: Performed by: PHYSICIAN ASSISTANT

## 2022-05-16 PROCEDURE — 10004651 HB RX, NO CHARGE ITEM: Performed by: PHYSICIAN ASSISTANT

## 2022-05-16 PROCEDURE — 80048 BASIC METABOLIC PNL TOTAL CA: CPT | Performed by: PHYSICIAN ASSISTANT

## 2022-05-16 PROCEDURE — 10003585 HB ROOM CHARGE INTERMEDIATE CARE

## 2022-05-16 PROCEDURE — 83735 ASSAY OF MAGNESIUM: CPT | Performed by: PHYSICIAN ASSISTANT

## 2022-05-16 PROCEDURE — 36415 COLL VENOUS BLD VENIPUNCTURE: CPT | Performed by: PHYSICIAN ASSISTANT

## 2022-05-16 PROCEDURE — 85027 COMPLETE CBC AUTOMATED: CPT | Performed by: PHYSICIAN ASSISTANT

## 2022-05-16 PROCEDURE — 10002803 HB RX 637: Performed by: INTERNAL MEDICINE

## 2022-05-16 PROCEDURE — 97530 THERAPEUTIC ACTIVITIES: CPT

## 2022-05-16 RX ADMIN — ROSUVASTATIN CALCIUM 40 MG: 20 TABLET, FILM COATED ORAL at 21:06

## 2022-05-16 RX ADMIN — MIDODRINE HYDROCHLORIDE 10 MG: 5 TABLET ORAL at 14:56

## 2022-05-16 RX ADMIN — MIDODRINE HYDROCHLORIDE 10 MG: 5 TABLET ORAL at 06:40

## 2022-05-16 RX ADMIN — HYDROCODONE BITARTRATE AND ACETAMINOPHEN 1 TABLET: 5; 325 TABLET ORAL at 14:56

## 2022-05-16 RX ADMIN — POLYETHYLENE GLYCOL 3350 17 G: 17 POWDER, FOR SOLUTION ORAL at 08:26

## 2022-05-16 RX ADMIN — MUPIROCIN 1 APPLICATION.: 20 OINTMENT TOPICAL at 21:06

## 2022-05-16 RX ADMIN — MUPIROCIN 1 APPLICATION.: 20 OINTMENT TOPICAL at 08:29

## 2022-05-16 RX ADMIN — METOPROLOL TARTRATE 25 MG: 25 TABLET, FILM COATED ORAL at 21:06

## 2022-05-16 RX ADMIN — ACETAMINOPHEN 650 MG: 325 TABLET, FILM COATED ORAL at 08:26

## 2022-05-16 RX ADMIN — SODIUM CHLORIDE, PRESERVATIVE FREE 2 ML: 5 INJECTION INTRAVENOUS at 08:29

## 2022-05-16 RX ADMIN — SODIUM CHLORIDE, PRESERVATIVE FREE 2 ML: 5 INJECTION INTRAVENOUS at 21:12

## 2022-05-16 RX ADMIN — PANTOPRAZOLE SODIUM 40 MG: 40 TABLET, DELAYED RELEASE ORAL at 08:26

## 2022-05-16 RX ADMIN — Medication 1000 MCG: at 08:27

## 2022-05-16 RX ADMIN — Medication 6 MG: at 21:59

## 2022-05-16 RX ADMIN — MIDODRINE HYDROCHLORIDE 10 MG: 5 TABLET ORAL at 21:59

## 2022-05-16 ASSESSMENT — PAIN SCALES - GENERAL
PAINLEVEL_OUTOF10: 0
PAINLEVEL_OUTOF10: 4
PAINLEVEL_OUTOF10: 2
PAINLEVEL_OUTOF10: 0
PAINLEVEL_OUTOF10: 0
PAINLEVEL_OUTOF10: 2
PAINLEVEL_OUTOF10: 0

## 2022-05-16 ASSESSMENT — COGNITIVE AND FUNCTIONAL STATUS - GENERAL
BASIC_MOBILITY_CONVERTED_SCORE: 38.32
BECAUSE OF A PHYSICAL, MENTAL, OR EMOTIONAL CONDITION, DO YOU HAVE SERIOUS DIFFICULTY CONCENTRATING, REMEMBERING OR MAKING DECISIONS: NO
DO YOU HAVE DIFFICULTY DRESSING OR BATHING: NO
BECAUSE OF A PHYSICAL, MENTAL, OR EMOTIONAL CONDITION, DO YOU HAVE DIFFICULTY DOING ERRANDS ALONE: NO
DO YOU HAVE SERIOUS DIFFICULTY WALKING OR CLIMBING STAIRS: NO
BASIC_MOBILITY_RAW_SCORE: 16

## 2022-05-16 ASSESSMENT — ENCOUNTER SYMPTOMS: PAIN SEVERITY NOW: 3

## 2022-05-17 LAB
ANION GAP SERPL CALC-SCNC: 8 MMOL/L (ref 10–20)
BUN SERPL-MCNC: 21 MG/DL (ref 6–20)
BUN/CREAT SERPL: 15 (ref 7–25)
CALCIUM SERPL-MCNC: 8 MG/DL (ref 8.4–10.2)
CHLORIDE SERPL-SCNC: 110 MMOL/L (ref 98–107)
CO2 SERPL-SCNC: 25 MMOL/L (ref 21–32)
CREAT SERPL-MCNC: 1.43 MG/DL (ref 0.67–1.17)
DEPRECATED RDW RBC: 47.5 FL (ref 39–50)
ERYTHROCYTE [DISTWIDTH] IN BLOOD: 13.2 % (ref 11–15)
FASTING DURATION TIME PATIENT: ABNORMAL H
GFR SERPLBLD BASED ON 1.73 SQ M-ARVRAT: 49 ML/MIN
GLUCOSE SERPL-MCNC: 108 MG/DL (ref 70–99)
HCT VFR BLD CALC: 25.8 % (ref 39–51)
HGB BLD-MCNC: 8.5 G/DL (ref 13–17)
MCH RBC QN AUTO: 32.6 PG (ref 26–34)
MCHC RBC AUTO-ENTMCNC: 32.9 G/DL (ref 32–36.5)
MCV RBC AUTO: 98.9 FL (ref 78–100)
NRBC BLD MANUAL-RTO: 0 /100 WBC
PLATELET # BLD AUTO: 131 K/MCL (ref 140–450)
POTASSIUM SERPL-SCNC: 3.8 MMOL/L (ref 3.4–5.1)
RBC # BLD: 2.61 MIL/MCL (ref 4.5–5.9)
SODIUM SERPL-SCNC: 139 MMOL/L (ref 135–145)
WBC # BLD: 8.8 K/MCL (ref 4.2–11)

## 2022-05-17 PROCEDURE — 10002803 HB RX 637: Performed by: INTERNAL MEDICINE

## 2022-05-17 PROCEDURE — 10002800 HB RX 250 W HCPCS: Performed by: NURSE PRACTITIONER

## 2022-05-17 PROCEDURE — 97530 THERAPEUTIC ACTIVITIES: CPT

## 2022-05-17 PROCEDURE — 97116 GAIT TRAINING THERAPY: CPT

## 2022-05-17 PROCEDURE — 80048 BASIC METABOLIC PNL TOTAL CA: CPT | Performed by: PHYSICIAN ASSISTANT

## 2022-05-17 PROCEDURE — 10003585 HB ROOM CHARGE INTERMEDIATE CARE

## 2022-05-17 PROCEDURE — 36415 COLL VENOUS BLD VENIPUNCTURE: CPT | Performed by: PHYSICIAN ASSISTANT

## 2022-05-17 PROCEDURE — 10002803 HB RX 637: Performed by: PHYSICIAN ASSISTANT

## 2022-05-17 PROCEDURE — 85027 COMPLETE CBC AUTOMATED: CPT | Performed by: PHYSICIAN ASSISTANT

## 2022-05-17 PROCEDURE — 10004651 HB RX, NO CHARGE ITEM: Performed by: PHYSICIAN ASSISTANT

## 2022-05-17 PROCEDURE — 10002803 HB RX 637: Performed by: HOSPITALIST

## 2022-05-17 PROCEDURE — 10002803 HB RX 637: Performed by: NURSE PRACTITIONER

## 2022-05-17 RX ORDER — FUROSEMIDE 10 MG/ML
40 INJECTION INTRAMUSCULAR; INTRAVENOUS ONCE
Status: COMPLETED | OUTPATIENT
Start: 2022-05-17 | End: 2022-05-17

## 2022-05-17 RX ORDER — AMIODARONE HYDROCHLORIDE 200 MG/1
200 TABLET ORAL DAILY
Status: DISCONTINUED | OUTPATIENT
Start: 2022-05-17 | End: 2022-05-18 | Stop reason: HOSPADM

## 2022-05-17 RX ORDER — POTASSIUM CHLORIDE 20 MEQ/1
40 TABLET, EXTENDED RELEASE ORAL
Status: DISCONTINUED | OUTPATIENT
Start: 2022-05-17 | End: 2022-05-18 | Stop reason: HOSPADM

## 2022-05-17 RX ORDER — MIDODRINE HYDROCHLORIDE 5 MG/1
5 TABLET ORAL EVERY 8 HOURS SCHEDULED
Status: DISCONTINUED | OUTPATIENT
Start: 2022-05-17 | End: 2022-05-18 | Stop reason: HOSPADM

## 2022-05-17 RX ADMIN — Medication 6 MG: at 20:57

## 2022-05-17 RX ADMIN — ACETAMINOPHEN 650 MG: 325 TABLET, FILM COATED ORAL at 09:53

## 2022-05-17 RX ADMIN — MUPIROCIN 1 APPLICATION.: 20 OINTMENT TOPICAL at 20:59

## 2022-05-17 RX ADMIN — POLYETHYLENE GLYCOL 3350 17 G: 17 POWDER, FOR SOLUTION ORAL at 20:59

## 2022-05-17 RX ADMIN — FUROSEMIDE 20 MG: 10 INJECTION, SOLUTION INTRAMUSCULAR; INTRAVENOUS at 17:20

## 2022-05-17 RX ADMIN — METOPROLOL TARTRATE 25 MG: 25 TABLET, FILM COATED ORAL at 09:54

## 2022-05-17 RX ADMIN — FUROSEMIDE 40 MG: 10 INJECTION INTRAVENOUS at 11:23

## 2022-05-17 RX ADMIN — METOPROLOL TARTRATE 25 MG: 25 TABLET, FILM COATED ORAL at 20:57

## 2022-05-17 RX ADMIN — HYDROCODONE BITARTRATE AND ACETAMINOPHEN 1 TABLET: 5; 325 TABLET ORAL at 09:54

## 2022-05-17 RX ADMIN — MIDODRINE HYDROCHLORIDE 10 MG: 5 TABLET ORAL at 05:47

## 2022-05-17 RX ADMIN — PANTOPRAZOLE SODIUM 40 MG: 40 TABLET, DELAYED RELEASE ORAL at 05:47

## 2022-05-17 RX ADMIN — ROSUVASTATIN CALCIUM 40 MG: 20 TABLET, FILM COATED ORAL at 20:57

## 2022-05-17 RX ADMIN — MUPIROCIN 1 APPLICATION.: 20 OINTMENT TOPICAL at 09:55

## 2022-05-17 RX ADMIN — Medication 1000 MCG: at 09:54

## 2022-05-17 RX ADMIN — SODIUM CHLORIDE, PRESERVATIVE FREE 2 ML: 5 INJECTION INTRAVENOUS at 09:55

## 2022-05-17 RX ADMIN — SODIUM CHLORIDE, PRESERVATIVE FREE 2 ML: 5 INJECTION INTRAVENOUS at 20:58

## 2022-05-17 RX ADMIN — HYDROCODONE BITARTRATE AND ACETAMINOPHEN 2 TABLET: 5; 325 TABLET ORAL at 20:57

## 2022-05-17 RX ADMIN — MIDODRINE HYDROCHLORIDE 5 MG: 5 TABLET ORAL at 12:37

## 2022-05-17 RX ADMIN — AMIODARONE HYDROCHLORIDE 200 MG: 200 TABLET ORAL at 12:37

## 2022-05-17 RX ADMIN — POTASSIUM CHLORIDE 40 MEQ: 1500 TABLET, EXTENDED RELEASE ORAL at 09:54

## 2022-05-17 ASSESSMENT — COGNITIVE AND FUNCTIONAL STATUS - GENERAL
BASIC_MOBILITY_RAW_SCORE: 17
BASIC_MOBILITY_CONVERTED_SCORE: 39.67

## 2022-05-17 ASSESSMENT — PAIN SCALES - GENERAL
PAINLEVEL_OUTOF10: 3
PAINLEVEL_OUTOF10: 4
PAINLEVEL_OUTOF10: 2
PAINLEVEL_OUTOF10: 0

## 2022-05-18 VITALS
TEMPERATURE: 97 F | HEIGHT: 67 IN | BODY MASS INDEX: 30.8 KG/M2 | RESPIRATION RATE: 16 BRPM | DIASTOLIC BLOOD PRESSURE: 68 MMHG | HEART RATE: 86 BPM | SYSTOLIC BLOOD PRESSURE: 125 MMHG | OXYGEN SATURATION: 96 % | WEIGHT: 196.21 LBS

## 2022-05-18 LAB
ANION GAP SERPL CALC-SCNC: 8 MMOL/L (ref 10–20)
BUN SERPL-MCNC: 25 MG/DL (ref 6–20)
BUN/CREAT SERPL: 14 (ref 7–25)
CALCIUM SERPL-MCNC: 8.4 MG/DL (ref 8.4–10.2)
CHLORIDE SERPL-SCNC: 108 MMOL/L (ref 98–107)
CO2 SERPL-SCNC: 29 MMOL/L (ref 21–32)
CREAT SERPL-MCNC: 1.73 MG/DL (ref 0.67–1.17)
DEPRECATED RDW RBC: 49.8 FL (ref 39–50)
ERYTHROCYTE [DISTWIDTH] IN BLOOD: 13.2 % (ref 11–15)
FASTING DURATION TIME PATIENT: ABNORMAL H
GFR SERPLBLD BASED ON 1.73 SQ M-ARVRAT: 39 ML/MIN
GLUCOSE SERPL-MCNC: 102 MG/DL (ref 70–99)
HCT VFR BLD CALC: 28.7 % (ref 39–51)
HGB BLD-MCNC: 9.5 G/DL (ref 13–17)
MCH RBC QN AUTO: 33.6 PG (ref 26–34)
MCHC RBC AUTO-ENTMCNC: 33.1 G/DL (ref 32–36.5)
MCV RBC AUTO: 101.4 FL (ref 78–100)
NRBC BLD MANUAL-RTO: 0 /100 WBC
PLATELET # BLD AUTO: 172 K/MCL (ref 140–450)
POTASSIUM SERPL-SCNC: 4.4 MMOL/L (ref 3.4–5.1)
RBC # BLD: 2.83 MIL/MCL (ref 4.5–5.9)
SODIUM SERPL-SCNC: 141 MMOL/L (ref 135–145)
WBC # BLD: 9 K/MCL (ref 4.2–11)

## 2022-05-18 PROCEDURE — 97116 GAIT TRAINING THERAPY: CPT

## 2022-05-18 PROCEDURE — 97530 THERAPEUTIC ACTIVITIES: CPT

## 2022-05-18 PROCEDURE — 36415 COLL VENOUS BLD VENIPUNCTURE: CPT | Performed by: PHYSICIAN ASSISTANT

## 2022-05-18 PROCEDURE — 80048 BASIC METABOLIC PNL TOTAL CA: CPT | Performed by: PHYSICIAN ASSISTANT

## 2022-05-18 PROCEDURE — 10002803 HB RX 637: Performed by: PHYSICIAN ASSISTANT

## 2022-05-18 PROCEDURE — 10002803 HB RX 637: Performed by: NURSE PRACTITIONER

## 2022-05-18 PROCEDURE — 85027 COMPLETE CBC AUTOMATED: CPT | Performed by: PHYSICIAN ASSISTANT

## 2022-05-18 PROCEDURE — 10002803 HB RX 637: Performed by: HOSPITALIST

## 2022-05-18 PROCEDURE — 97535 SELF CARE MNGMENT TRAINING: CPT

## 2022-05-18 PROCEDURE — 10004651 HB RX, NO CHARGE ITEM: Performed by: PHYSICIAN ASSISTANT

## 2022-05-18 RX ORDER — HYDROCODONE BITARTRATE AND ACETAMINOPHEN 5; 325 MG/1; MG/1
1 TABLET ORAL EVERY 6 HOURS PRN
Qty: 20 TABLET | Refills: 0 | Status: SHIPPED | OUTPATIENT
Start: 2022-05-18

## 2022-05-18 RX ORDER — POLYETHYLENE GLYCOL 3350 17 G/17G
17 POWDER, FOR SOLUTION ORAL DAILY PRN
Qty: 12 EACH | Refills: 0 | Status: SHIPPED
Start: 2022-05-18

## 2022-05-18 RX ORDER — ASPIRIN 325 MG
325 TABLET, DELAYED RELEASE (ENTERIC COATED) ORAL DAILY
Qty: 30 TABLET | Refills: 0 | Status: SHIPPED | OUTPATIENT
Start: 2022-05-19

## 2022-05-18 RX ORDER — MIDODRINE HYDROCHLORIDE 5 MG/1
5 TABLET ORAL EVERY 8 HOURS SCHEDULED
Qty: 90 TABLET | Refills: 0 | Status: SHIPPED | OUTPATIENT
Start: 2022-05-18

## 2022-05-18 RX ORDER — AMIODARONE HYDROCHLORIDE 200 MG/1
200 TABLET ORAL DAILY
Qty: 30 TABLET | Refills: 0 | Status: SHIPPED | OUTPATIENT
Start: 2022-05-19

## 2022-05-18 RX ADMIN — Medication 1000 MCG: at 08:37

## 2022-05-18 RX ADMIN — POTASSIUM CHLORIDE 40 MEQ: 1500 TABLET, EXTENDED RELEASE ORAL at 08:37

## 2022-05-18 RX ADMIN — AMIODARONE HYDROCHLORIDE 200 MG: 200 TABLET ORAL at 08:37

## 2022-05-18 RX ADMIN — SODIUM CHLORIDE, PRESERVATIVE FREE 2 ML: 5 INJECTION INTRAVENOUS at 08:38

## 2022-05-18 RX ADMIN — DOCUSATE SODIUM AND SENNOSIDES 2 TABLET: 8.6; 5 TABLET, FILM COATED ORAL at 08:37

## 2022-05-18 RX ADMIN — MIDODRINE HYDROCHLORIDE 5 MG: 5 TABLET ORAL at 10:51

## 2022-05-18 RX ADMIN — MUPIROCIN 1 APPLICATION.: 20 OINTMENT TOPICAL at 08:38

## 2022-05-18 RX ADMIN — MIDODRINE HYDROCHLORIDE 5 MG: 5 TABLET ORAL at 06:08

## 2022-05-18 RX ADMIN — PANTOPRAZOLE SODIUM 40 MG: 40 TABLET, DELAYED RELEASE ORAL at 06:09

## 2022-05-18 RX ADMIN — ASPIRIN 325 MG: 325 TABLET, COATED ORAL at 08:37

## 2022-05-18 RX ADMIN — POLYETHYLENE GLYCOL 3350 17 G: 17 POWDER, FOR SOLUTION ORAL at 08:37

## 2022-05-18 ASSESSMENT — COLUMBIA-SUICIDE SEVERITY RATING SCALE - C-SSRS
IS THE PATIENT ABLE TO COMPLETE C-SSRS: YES
1. WITHIN THE PAST MONTH, HAVE YOU WISHED YOU WERE DEAD OR WISHED YOU COULD GO TO SLEEP AND NOT WAKE UP?: NO
2. HAVE YOU ACTUALLY HAD ANY THOUGHTS OF KILLING YOURSELF?: NO
6. HAVE YOU EVER DONE ANYTHING, STARTED TO DO ANYTHING, OR PREPARED TO DO ANYTHING TO END YOUR LIFE?: NO

## 2022-05-18 ASSESSMENT — ENCOUNTER SYMPTOMS
PAIN SEVERITY NOW: 0
PAIN SEVERITY NOW: 2

## 2022-05-18 ASSESSMENT — COGNITIVE AND FUNCTIONAL STATUS - GENERAL
BASIC_MOBILITY_CONVERTED_SCORE: 41.05
BASIC_MOBILITY_RAW_SCORE: 18

## 2022-05-18 ASSESSMENT — PATIENT HEALTH QUESTIONNAIRE - PHQ9
1. LITTLE INTEREST OR PLEASURE IN DOING THINGS: NOT AT ALL
SUM OF ALL RESPONSES TO PHQ9 QUESTIONS 1 AND 2: 0
SUM OF ALL RESPONSES TO PHQ9 QUESTIONS 1 AND 2: 0
IS PATIENT ABLE TO COMPLETE PHQ2 OR PHQ9: YES
2. FEELING DOWN, DEPRESSED OR HOPELESS: NOT AT ALL
CLINICAL INTERPRETATION OF PHQ2 SCORE: NO FURTHER SCREENING NEEDED

## 2022-05-18 ASSESSMENT — ACTIVITIES OF DAILY LIVING (ADL): HOME_MANAGEMENT_TIME_ENTRY: 33

## 2022-05-18 ASSESSMENT — PAIN SCALES - GENERAL: PAINLEVEL_OUTOF10: 0

## 2022-05-24 ENCOUNTER — LAB REQUISITION (OUTPATIENT)
Dept: LAB | Age: 82
End: 2022-05-24

## 2022-05-24 DIAGNOSIS — I25.119 ATHEROSCLEROTIC HEART DISEASE OF NATIVE CORONARY ARTERY WITH UNSPECIFIED ANGINA PECTORIS (CMD): ICD-10-CM

## 2022-05-24 LAB
ANION GAP SERPL CALC-SCNC: 12 MMOL/L (ref 7–19)
BUN SERPL-MCNC: 19 MG/DL (ref 6–20)
BUN/CREAT SERPL: 13 (ref 7–25)
CALCIUM SERPL-MCNC: 8.7 MG/DL (ref 8.4–10.2)
CHLORIDE SERPL-SCNC: 105 MMOL/L (ref 97–110)
CO2 SERPL-SCNC: 27 MMOL/L (ref 21–32)
CREAT SERPL-MCNC: 1.42 MG/DL (ref 0.67–1.17)
FASTING DURATION TIME PATIENT: ABNORMAL H
GFR SERPLBLD BASED ON 1.73 SQ M-ARVRAT: 50 ML/MIN
GLUCOSE SERPL-MCNC: 139 MG/DL (ref 70–99)
POTASSIUM SERPL-SCNC: 4.6 MMOL/L (ref 3.4–5.1)
RAINBOW EXTRA TUBES HOLD SPECIMEN: NORMAL
SODIUM SERPL-SCNC: 139 MMOL/L (ref 135–145)

## 2022-05-24 PROCEDURE — 80048 BASIC METABOLIC PNL TOTAL CA: CPT | Performed by: CLINICAL MEDICAL LABORATORY

## (undated) DEVICE — BLADE ELECTRODE: Brand: EDGE

## (undated) DEVICE — HEMOSTAT ABS 8X4IN FLXB SHEER WEAVE SURGICEL STRL DISP

## (undated) DEVICE — ANTERIOR HIP: Brand: MEDLINE INDUSTRIES, INC.

## (undated) DEVICE — GLOVE SURG 7.5 PROTEXIS LF CRM PF SMTH BEAD CUFF STRL

## (undated) DEVICE — DRAPE SLUSH WRMR RND BASIN 66X44IN EQUIPMENT STRL

## (undated) DEVICE — NEEDLE HPO 18GA 1.5IN REG WALL REG BVL LL HUB CLR CD DEHP-FR

## (undated) DEVICE — SHEET,DRAPE,70X100,STERILE: Brand: MEDLINE

## (undated) DEVICE — SPONGE LAP 18X18IN STRL

## (undated) DEVICE — GLOVE SURG 7 BIOGEL M LTX STRAW PF TXTR BEAD CUFF N-PYRG

## (undated) DEVICE — SUTURE MONOCRYL 3-0 PS-2

## (undated) DEVICE — GOWN SURG LG L3 NONREINFORCE SET IN SLV STRL LF DISP BLUE

## (undated) DEVICE — SYSTEM VSL HRVT VIRTUOSAPH + DSCT HRVT ROD TROCAR PTFE ENDO

## (undated) DEVICE — BLADE SAW 32X6.4MM THK.79MM THK1.07MM STRNM STRL

## (undated) DEVICE — POUCH INST 11X7IN 2 ADH STRIP 2 CMPRT STRL STRDRP PLASTIC

## (undated) DEVICE — BLADE OPHTHALMIC 30D 3.5MM STAB INCS MCRSHRP STRL LF DISP

## (undated) DEVICE — SUTURE PROLENE 7-0 BV-1 24IN 2 ARM MONO NABSB BLUE 8702H

## (undated) DEVICE — INSERT CLAMP STLTH 3 60MM LATIS STRL LF DISP

## (undated) DEVICE — GLOVE SURG 6 PROTEXIS LTX YLW PF SMTH BEAD CUFF STRL

## (undated) DEVICE — KIT INTRO 6FR 10CM 45CM .021IN SPRG STRGT GW BSC GLIDESHEATH

## (undated) DEVICE — SYRINGE 6ML STD LL TIP GRAD STRL MED LF DISP MNJCT PP

## (undated) DEVICE — COVER,BOOT,FOAM,NON-SKID,HOOK-LOOP,XLG: Brand: MEDLINE INDUSTRIES, INC.

## (undated) DEVICE — GLOVE SURG 6 DRMPRN ULTRA SRFT TECHNOLOGY LF DARK GRN PF

## (undated) DEVICE — WIRE MYOWR2 6 .5 CRC CCS1 14X3IN 2 MONO SUT SS STRNM

## (undated) DEVICE — SYRINGE 10ML GRAD N-PYRG DEHP-FR PVC FREE STRL MED LF DISP

## (undated) DEVICE — Device

## (undated) DEVICE — LIGHT HANDLE

## (undated) DEVICE — CANNULA PRFSN 3MM 2.5IN 3MM 1 WAY VLV FEMALE LL DCKBL BLUNT

## (undated) DEVICE — GLOVE SURG 8 GAMMEX LF GRN CHMO PF ANSLP SMTH MIC TXTR STRL

## (undated) DEVICE — GLOVE SURG 5.5 PROTEXIS LF CRM PF BEAD CUFF STRL PLISPRN

## (undated) DEVICE — SYRINGE 30ML CONC TIP GRAD N-PYRG DEHP-FR STRL MED LF DISP

## (undated) DEVICE — STAPLER SKIN 3.9X6.9MM WIDE RECT 35 CNT ROTATE HEAD RCHT

## (undated) DEVICE — STRIP 4X.5IN STRSTRP IPHR POLY POR ADH REINFORCE

## (undated) DEVICE — ELECTRODE PT RTN C30- LB 9FT CORD NONIRRITATE NONSENSITIZE

## (undated) DEVICE — CATHETER 5FR PGTL CRV 110CM FULL LGTH WIRE BRAID ROBUST

## (undated) DEVICE — SET ADM IV 20ML 112IN 15 GTTML BKCHK VLV 3 INJ SITE UNV SPK

## (undated) DEVICE — CATHETER DRN 32FR 23IN RT ANG 5 EYLT TPR CNCT TIP THRMSNS

## (undated) DEVICE — DEVICE BLWRMSTR 6.5IN CLRVW HNDPC

## (undated) DEVICE — GOWN SURG XL L4 RAGLAN SLV BRTHBL STRL LF DISP SMARTGOWN

## (undated) DEVICE — POSITIONER OR KT PT CR

## (undated) DEVICE — SUTURE FIBERWIRE S AR-7200

## (undated) DEVICE — BIPOLAR SEALER 23-112-1 AQM 6.0: Brand: AQUAMANTYS™

## (undated) DEVICE — 1010 S-DRAPE TOWEL DRAPE 10/BX: Brand: STERI-DRAPE™

## (undated) DEVICE — STERILE POLYISOPRENE POWDER-FREE SURGICAL GLOVES: Brand: PROTEXIS

## (undated) DEVICE — PUNCH 4MM LONG HNDL AOR

## (undated) DEVICE — CATHETER 6FR FL3.5 CRV 100CM FULL LGTH WIRE BRAID ROBUST

## (undated) DEVICE — 3M™ STERI-DRAPE™ INSTRUMENT POUCH 1018: Brand: STERI-DRAPE™

## (undated) DEVICE — 1016 S-DRAPE IRRIG POUCH 10/BOX: Brand: STERI-DRAPE™

## (undated) DEVICE — NEEDLE SPINAL 18X3-1/2 PINK.

## (undated) DEVICE — TRAY CATH SURESTEP LUBRI-SIL STLK 16FR 350ML FOLEY URMTR

## (undated) DEVICE — HOOD, PEEL-AWAY: Brand: FLYTE

## (undated) DEVICE — TUBING INSFL PNEUMOCLEAR SET HFL

## (undated) DEVICE — BANDAGE CMPR VELCLOSE 5YDX4IN ELASTIC VELCRO POLY YARN LYCRA

## (undated) DEVICE — TUBING SCT CLR 12FT 316IN MDVC MAXI-GRIP NCDTV MALE TO MALE

## (undated) DEVICE — SUTURE STRATAFIX PDS PLUS 45CM

## (undated) DEVICE — SPONGE GAUZE 4X4IN CTN 16 PLY WOVEN FOLD EDGE STRL LF

## (undated) DEVICE — SYRINGE 5ML GRAD N-PYRG DEHP-FR PVC FREE STRL MED LF DISP LL

## (undated) DEVICE — BANDAGE CMPR VELCLOSE 5YDX6IN HOOK SLFCLSR KNIT ELASTIC MED

## (undated) DEVICE — DRAPE ADH STRIP SM TWL MATTE FNSH 17X11IN SURG STRDRP STRL

## (undated) DEVICE — Device: Brand: STABLECUT®

## (undated) DEVICE — 3M™ IOBAN™ 2 ANTIMICROBIAL INCISE DRAPE 6650EZ: Brand: IOBAN™ 2

## (undated) DEVICE — SYRINGE 30ML LL TIP

## (undated) DEVICE — KIT SUT 30IN 2-0 KC-2 BRAID DEKNATEL 5-PAK SILKY 2 PLYDK

## (undated) DEVICE — DRESSING HDRCLD 12X3.5IN CVR LAYER WTPRF BCTR BRR

## (undated) DEVICE — SUTURE VICRYL 2-0 CP-1

## (undated) DEVICE — SPONGE ABS THK10MM 12.5X8CM PORCINE GELTN STRL DISP SURGFM

## (undated) DEVICE — DRESSING TRANS 4.75X4IN ADH HPOAL WTPRF TEGADERM PU STD STRL

## (undated) DEVICE — HEMOSTAT CMPR VASC REG 24CM

## (undated) DEVICE — SUTURE VICRYL 0 CP-1

## (undated) DEVICE — CATHETER DRN 32FR 23IN STRGT TPR TIP KINK RST 6 EYE N-PYRG

## (undated) DEVICE — STERILE SYNTHETIC POLYISOPRENE POWDER-FREE SURGICAL GLOVES WITH HYDROGEL COATING, SMOOTH FINISH, STRAIGHT FINGER: Brand: PROTEXIS

## (undated) DEVICE — SOL  .9 3000ML

## (undated) DEVICE — SPONGE LAP 18X18IN PREWASH WHT

## (undated) DEVICE — WRAP COOLING HIP W/ICE PILLOW

## (undated) DEVICE — GLOVE SURG 7 PROTEXIS LF CRM PF BEAD CUFF STRL PLISPRN 12IN

## (undated) DEVICE — GUIDEWIRE GUIDERIGHT 5CM 260CM 3MM RDS STD J CRV EXCH .35IN

## (undated) DEVICE — GLOVE SURG 6.5 PROTEXIS LF CRM PF BEAD CUFF STRL PLISPRN

## (undated) DEVICE — NEEDLE FLTR 18GA 1.5IN 5UM REG WALL BLUNT BVL LL HUB DEHP-FR

## (undated) DEVICE — DRAPE 2 INCS FILM ANTIMICROBIAL 33X23IN SURG IOBAN STRL

## (undated) DEVICE — CATHETER 6FR FL4 CRV 100CM WIRE BRAID ROBUST SHAFT EXPO

## (undated) DEVICE — DRAIN INCS ATR OASIS PLASTIC CHEST THOR TUBE DRY SCT

## (undated) DEVICE — DECANTER FLUID DSPNSR BAG BAJ DISP STRL LF ASEPTIC TRNSF

## (undated) NOTE — IP AVS SNAPSHOT
Patient Demographics     Address  [address] [place] [address] Phone  [phone] [facility])  [phone] (Mobile) *Preferred* E-mail Address  [email or web address]      Emergency Contact(s)     Name Relation Home Work Mobile    Tres[place] Spouse [address] [facility]            Driving  • Do not drive until cleared by surgeon. This is usually four to six weeks after surgery. Discuss this at follow-up office visit. • Not allowed while taking narcotic pain medication or muscle relaxants.     Sex  • Usu easier and bruise easier while on these medications. • Usually you will be on a blood thinner for about 4-5 weeks. • Contact your physician if you have signs of bruising, nose bleeds or blood in your urine.  Use electric razors and soft toothbrushes on of fluids. • Use stool softeners such as Colace or Senakot while on narcotics, and laxatives such as Miralax or Milk of Magnesia if needed. • An enema or suppository may be needed if above measures do not work.       Prevention of infection and promotion Separation of incision line. • Increased redness, swelling, or warmth of skin around incision. • Increased or foul smelling drainage from incision  • Red streaks on skin near incision.   • Temperature >101 F.  • Increased pain at incision not relieved by [facility], you may check with them as well). You need space to open car doors to position yourself properly with walker to get in and out of your car safely; some parking spaces are  practically on top of each other and do not give you enough room.        SP Oint  Commonly known as: BACTROBAN  Next dose due: Tonight 9 pm      Apply 10 Application topically 2 (two) times daily for 5 days. Apply to each nostril starting 5 days prior to surgery, including morning of.   Stop taking on: May 9, 2021   [facility] [id number] acetaminophen (TYLENOL) tab 650 mg 05/03/21 2000 Given      [id number] acetaminophen (TYLENOL) tab 650 mg 05/04/21 0854 Given      [id number] acetaminophen (TYLENOL) tab 650 mg 05/04/21 1224 Given      [id number] aspirin EC tab 81 mg 05/03/21 2000 G Say Garza MD at 05/03/21 1612             Magdiel Pacheco Initial Consult       Reason for consult: Medical Management sp DAVID      History of Present Illness: Patient is a [de-identified]year old male with PMH sig for OA, cad sp cardiomyopathy  who presents sp DAVID.    The Ht 5' 7\" (1.702 m)   Wt 195 lb 5.2 oz (88.6 kg)   SpO2 96%   BMI 31.53 kg/m²   General:  Alert, no distress, appears stated age. Head:  Normocephalic, without obvious abnormality, atraumatic.    Eyes:  Sclera anicteric, No conjunctival pallor, EOMs intac patient. Sandro Chin MD  [facility] Hospitalist  Pager [phone][JS.1]        Electronically signed by Brenda Marroquin MD on 5/3/2021  7:03 PM   Attribution Serina Ortega. 1 - Brenda Marroquin MD on 5/3/2021  7:01 PM                     D/C Summar assistive device. Pt lives with supportive spouse who will be able to assist as needed upon edw dc.         SUBJECTIVE  \"I feel pretty good\"    OBJECTIVE  Precautions: None  Fall Risk: Standard fall risk    WEIGHT BEARING RESTRICTION  Weight Bearing Rest transferred supine to sit with no assist, good sitting balance. Pt provided verbal instruction and demonstration of rw use, stood to rw mod ind. Pt able to perform left tke, marching in place with no left LE buckling, instructed in gait sequencing.   Pt a limitation presents during this admission. GOALS  Patient was able to achieve the following goals . ..     Patient was able to transfer Safely and independently   Patient able to ambulate on level surfaces Safely and independently   PPE worn by therapist: History:   Procedure Laterality Date   • ANGIOPLASTY (CORONARY)     • CATH PERCUTANEOUS  TRANSLUMINAL CORONARY ANGIOPLASTY     • COLONOSCOPY  2015   • TONSILLECTOMY     • VASECTOMY[MM.2]         OCCUPATIONAL PROFILE    HOME SITUATION[MM. 1]  Type of Home: T ASSESSMENT  AM-PAC ‘6-Clicks’ Inpatient Daily Activity Short Form   How much help from another person does the patient currently need…[MM.1]  -   Putting on and taking off regular lower body clothing?: A Little   -   Bathing (including washing, rinsing, dr Blood pressure taken at 120/50. Pt then completed a chair T/F via RW w/ supervision and requiring cues for hand positioning (I.e. pushing off from bed).     Pt was left in chair w/ alarm set, all questions and needs addressed, ice applied, SCDs in place, Patient discharged from Occupational Therapy services. Please re-order if a new functional limitation presents during this admission.     Patient was able to achieve the following goals:  Patient able to toilet transfer: with supervision  Patient able to d physical therapy, occupational therapy, activity as instructed.   - See additional Care Plan goals for specific interventions

## (undated) NOTE — IP AVS SNAPSHOT
[address]            (For Outpatient Use Only) Initial Admit Date: 5/3/2021   Inpt/Obs Admit Date: Inpt: N/A / Obs: N/A   Discharge Date:    Hospital Acct:  [de-identified]   MRN: [de-identified]   CSN: [id number]   CEID: [id number]        EN Number:  Insurance Type:    Subscriber Name:  Subscriber :    Subscriber ID:  Pt Rel to Subscriber:    Hospital Account Financial Class: Medicare    May 4, 2021

## (undated) NOTE — LETTER
[facility] Testing Department  Phone: [phone]  OUTSIDE TESTING RESULT REQUEST      TO:   Dr. Shakira Angel   Today's Date: 3/24/21    FAX #: [phone]     IMPORTANT: FOR YOUR IMMEDIATE ATTENTION  Please FAX all test results listed below to: [phone]